# Patient Record
Sex: FEMALE | Race: OTHER | NOT HISPANIC OR LATINO | Employment: OTHER | URBAN - METROPOLITAN AREA
[De-identification: names, ages, dates, MRNs, and addresses within clinical notes are randomized per-mention and may not be internally consistent; named-entity substitution may affect disease eponyms.]

---

## 2018-04-02 DIAGNOSIS — K08.20 UNSPECIFIED ATROPHY OF EDENTULOUS ALVEOLAR RIDGE: Primary | ICD-10-CM

## 2018-04-10 ENCOUNTER — TELEPHONE (OUTPATIENT)
Dept: SURGERY | Facility: CLINIC | Age: 68
End: 2018-04-10

## 2018-04-10 DIAGNOSIS — Z12.11 SCREENING FOR COLON CANCER: Primary | ICD-10-CM

## 2018-04-10 RX ORDER — FLUTICASONE FUROATE AND VILANTEROL TRIFENATATE 100; 25 UG/1; UG/1
1 POWDER RESPIRATORY (INHALATION) DAILY
Refills: 5 | COMMUNITY
Start: 2018-03-29

## 2018-04-10 RX ORDER — PANTOPRAZOLE SODIUM 40 MG/1
TABLET, DELAYED RELEASE ORAL
Refills: 5 | COMMUNITY
Start: 2018-03-29 | End: 2024-01-22

## 2018-04-10 RX ORDER — POLYETHYLENE GLYCOL 3350 17 G/17G
POWDER, FOR SOLUTION ORAL
Refills: 5 | COMMUNITY
Start: 2018-03-29 | End: 2024-01-22

## 2018-04-10 RX ORDER — ALBUTEROL SULFATE 0.83 MG/ML
SOLUTION RESPIRATORY (INHALATION)
Refills: 5 | COMMUNITY
Start: 2018-03-29

## 2018-04-10 RX ORDER — TIOTROPIUM BROMIDE INHALATION SPRAY 1.56 UG/1
2 SPRAY, METERED RESPIRATORY (INHALATION) DAILY
Refills: 5 | COMMUNITY
Start: 2018-03-30

## 2018-04-10 RX ORDER — HYDROXYZINE HYDROCHLORIDE 25 MG/1
TABLET, FILM COATED ORAL
Refills: 1 | COMMUNITY
Start: 2018-03-29

## 2018-04-10 NOTE — TELEPHONE ENCOUNTER
Reason for Colonoscopy Referral: screening  Any GI Symptoms: constipation  Last Colonoscopy: n/a  History of Colon Polyps: n/a  Family History of colon cancer or colon polyps (under 50- history of first degree relative w/colon cancer, what family member-age of onset: no  Iron Deficiency/Anemia: no   Meds reviewed and updated: yes  Anti-inflammatory meds/NSAIDS: no  Allergies updated: yes  6 month surgical history: no  Blood Thinners: no  Lung disease: asthma  Heart disease: no  Valve replacement: no  Kidney disease: no  SCHEDULED: schedule in [place]    Speak with daughter Corine [phone], patient is [place] speaking.

## 2018-04-12 ENCOUNTER — TELEPHONE (OUTPATIENT)
Dept: ENDOSCOPY | Facility: HOSPITAL | Age: 68
End: 2018-04-12

## 2018-04-12 DIAGNOSIS — Z12.11 SPECIAL SCREENING FOR MALIGNANT NEOPLASMS, COLON: Primary | ICD-10-CM

## 2018-04-12 RX ORDER — POLYETHYLENE GLYCOL 3350, SODIUM SULFATE ANHYDROUS, SODIUM BICARBONATE, SODIUM CHLORIDE, POTASSIUM CHLORIDE 236; 22.74; 6.74; 5.86; 2.97 G/4L; G/4L; G/4L; G/4L; G/4L
4 POWDER, FOR SOLUTION ORAL ONCE
Qty: 4000 ML | Refills: 0 | Status: SHIPPED | OUTPATIENT
Start: 2018-04-12 | End: 2018-04-12

## 2018-04-16 ENCOUNTER — INITIAL CONSULT (OUTPATIENT)
Dept: SURGERY | Facility: CLINIC | Age: 68
End: 2018-04-16
Payer: MEDICAID

## 2018-04-16 VITALS
OXYGEN SATURATION: 98 % | HEART RATE: 66 BPM | HEIGHT: 56 IN | WEIGHT: 157.81 LBS | SYSTOLIC BLOOD PRESSURE: 130 MMHG | DIASTOLIC BLOOD PRESSURE: 82 MMHG | BODY MASS INDEX: 35.5 KG/M2 | TEMPERATURE: 98 F

## 2018-04-16 DIAGNOSIS — K59.09 OTHER CONSTIPATION: ICD-10-CM

## 2018-04-16 DIAGNOSIS — Z86.19 HISTORY OF MUMPS: ICD-10-CM

## 2018-04-16 DIAGNOSIS — E66.9 OBESITY (BMI 35.0-39.9 WITHOUT COMORBIDITY): ICD-10-CM

## 2018-04-16 DIAGNOSIS — J45.20 MILD INTERMITTENT ASTHMA WITHOUT COMPLICATION: ICD-10-CM

## 2018-04-16 DIAGNOSIS — E04.1 THYROID NODULE: ICD-10-CM

## 2018-04-16 DIAGNOSIS — R22.0 MANDIBULAR MASS: Primary | ICD-10-CM

## 2018-04-16 DIAGNOSIS — K21.9 GASTROESOPHAGEAL REFLUX DISEASE, ESOPHAGITIS PRESENCE NOT SPECIFIED: ICD-10-CM

## 2018-04-16 PROCEDURE — 99999 PR PBB SHADOW E&M-EST. PATIENT-LVL IV: CPT | Mod: PBBFAC,,, | Performed by: SURGERY

## 2018-04-16 PROCEDURE — 99214 OFFICE O/P EST MOD 30 MIN: CPT | Mod: PBBFAC,PN | Performed by: SURGERY

## 2018-04-16 PROCEDURE — 99205 OFFICE O/P NEW HI 60 MIN: CPT | Mod: S$PBB,,, | Performed by: SURGERY

## 2018-04-16 RX ORDER — METHYLPREDNISOLONE 4 MG/1
TABLET ORAL
Refills: 0 | COMMUNITY
Start: 2018-04-12 | End: 2018-04-26

## 2018-04-16 NOTE — LETTER
April 19, 2018      Rena Valdez, NP  [address]  [facility] [address]  Phone: [phone]  Fax: [phone]          Patient: Carey Gilman   MR Number: [id number]   YOB: 1950   Date of Visit: 4/16/2018       Dear Rena Valdez:    Thank you for referring Carey Gilman to me for evaluation. Attached you will find relevant portions of my assessment and plan of care.    If you have questions, please do not hesitate to call me. I look forward to following Carey Gilman along with you.    Sincerely,        Enclosure  CC:  No Recipients    If you would like to receive this communication electronically, please contact [email or web address] or [phone] to request more information on [employer] access.    For providers and/or their staff who would like to refer a patient to [facility], please contact us through our one-stop-shop provider referral line, [facility], at [phone].    If you feel you have received this communication in error or would no longer like to receive these types of communications, please e-mail [email or web address]

## 2018-04-16 NOTE — PROGRESS NOTES
Subjective:      Patient ID: Carey Gilman is a 68 y.o. female.    Chief Complaint: Lump (Left side on jaw)  Pleasant 67yo Wolof-speaking patient (originally from the [place]) presents with her daughter for evaluation of left jawline mass.  The mass is been present for over 10 years and has been increasing in size over the past 3 years.  It is generally not painful and she describes it as firm.  An ultrasound was performed prior to presentation which revealed 2 thyroid nodules.  The left jaw mass was not evaluated on that US.  Patient is feeling otherwise well.  No fevers or chills.  No unexpected weight loss.  No night sweats.  No other masses elsewhere.  Of note, patient had the mumps as a child.    Past Medical History:   Diagnosis Date    Asthma     Chronic    Hypertension      Past Surgical History:   Procedure Laterality Date    CHOLECYSTECTOMY      HYSTERECTOMY       History reviewed. No pertinent family history.  Social History     Social History    Marital status:      Spouse name: N/A    Number of children: N/A    Years of education: N/A     Social History Main Topics    Smoking status: Never Smoker    Smokeless tobacco: Never Used    Alcohol use No    Drug use: No    Sexual activity: Not Asked     Other Topics Concern    None     Social History Narrative    None       Current Outpatient Prescriptions   Medication Sig Dispense Refill    albuterol (PROVENTIL) 2.5 mg /3 mL (0.083 %) nebulizer solution INHALE 1 VIAL VIA NEBULIZER THREE TIMES DAILY AS NEEDED FOR WHEEZING/ SHORTNESS OF BREATH  5    albuterol 90 mcg/actuation inhaler Inhale 1-2 puffs into the lungs every 6 (six) hours as needed for Wheezing. Rescue 1 Inhaler 2    BREO ELLIPTA 100-25 mcg/dose diskus inhaler Inhale 1 puff into the lungs once daily.  5    hydrOXYzine HCl (ATARAX) 25 MG tablet TAKE 1 TABLET(S) 3 TIMES A DAY BY ORAL ROUTE AS NEEDED.  1    losartan (COZAAR) 100 MG tablet Take 100 mg by  "mouth once daily.      methylPREDNISolone (MEDROL DOSEPACK) 4 mg tablet FOLLOW PACKAGE DIRECTIONS  0    pantoprazole (PROTONIX) 40 MG tablet TAKE 1 TABLET(S) EVERY DAY BY ORAL ROUTE.  5    polyethylene glycol (GLYCOLAX) 17 gram/dose powder DISSOLVE 17 GRAMS IN WATER AND TAKE BY MOUTH DAILY  5    SPIRIVA RESPIMAT 1.25 mcg/actuation Mist Take 2 puffs by mouth once daily.  5     No current facility-administered medications for this visit.      Review of patient's allergies indicates:  No Known Allergies    ROS:  All systems were reviewed and are negative, except that mentioned in the HPI.    Objective:     Vitals:    04/16/18 1002   BP: 130/82   BP Location: Left arm   Patient Position: Sitting   BP Method: Large (Manual)   Pulse: 66   Temp: 97.8 °F (36.6 °C)   SpO2: 98%   Weight: 71.6 kg (157 lb 12.8 oz)   Height: 4' 8" (1.422 m)     Physical Exam   Constitutional: She is oriented to person, place, and time. She appears well-developed and well-nourished. No distress.   HENT:   Head: Normocephalic and atraumatic.   Eyes: EOM are normal. Pupils are equal, round, and reactive to light. No scleral icterus.   Neck: Normal range of motion. Neck supple. No JVD present. No tracheal deviation present. No thyromegaly present.   Left sided mandibular mass measuring 3 cm x 2.5 cm, firm, nontender, no overlying skin changes   Cardiovascular: Normal rate and regular rhythm.    Pulmonary/Chest: Effort normal and breath sounds normal. No stridor. No respiratory distress.   Abdominal: Soft. She exhibits no distension.   Musculoskeletal: Normal range of motion.   Neurological: She is alert and oriented to person, place, and time. No cranial nerve deficit.   Skin: Skin is warm and dry. She is not diaphoretic.   Psychiatric: She has a normal mood and affect.       Lab Review: CBC: No results found for: WBC, RBC, HGB, HCT, PLT  BMP:   Lab Results   Component Value Date    CREATININE 0.81 04/20/2018     No results found for: ALT, AST, " GGT, ALKPHOS, BILITOT    Diagnostics Review:  U/S 4/6/2018 images and reports were personally reviewed.  The report states:  The right and left lobes of the thyroid measure 4.3 x 1.4 x 1.8 cm and 3.7 x 1.3 x 1.3 cm respectively.  There is a 1.4 x 0.8 x 1.3 cm eggshell calcification within the mid right lobe.  There is a 1.4 x 0.9 x 1.4 cm partially cystic heterogeneous hypoechoic nodule within the lower right lobe.  There is a 0.5 cm cystic nodule within the mid left lobe    US soft tissue 4/18/18:  There is a 2.4 x 2 x 1.8 cm solid heterogeneous mass corresponding to area of palpable concern.  Differential considerations include salivary tumor versus enlarged lymph node.  Consider further evaluation with CT of the neck with contrast.  Assessment:     1. Mandibular mass    2. Thyroid nodule    3. Mild intermittent asthma without complication    4. Gastroesophageal reflux disease, esophagitis presence not specified    5. Other constipation    6. Obesity (BMI 35.0-39.9 without comorbidity)    7. History of mumps      Plan:   The pathology of the patient's disease was discussed: Left mandibular mass, thyroid nodules, constipation. Written handouts were explained and given to the patient.  Imaging recommended for further evaluation of the left mandibular mass.  Ultrasound ordered.  Since the time of this visit, ultrasound of the left mandibular mass has been performed and reveals a 2.4 cm x 2cm x 1.8 cm solid mass.  Additional workup recommended and a CT maxillofacial with IV contrast has been ordered.  IR has been contacted for further evaluation of the thyroid nodules to determine if either lesion meets current criteria for biopsy.  Patient prefers a Portuguese-speaking provider if biopsy is needed.  Medical management of constipation was discussed in handouts given.  Patient will follow up in the office after the CT is performed  She will continue on her current medical regimen.  Diet, exercise and weight loss was  encouraged.  All questions were answered.

## 2018-04-19 ENCOUNTER — TELEPHONE (OUTPATIENT)
Dept: SURGERY | Facility: CLINIC | Age: 68
End: 2018-04-19

## 2018-04-19 DIAGNOSIS — M27.8 MASS OF JAW: Primary | ICD-10-CM

## 2018-04-19 NOTE — TELEPHONE ENCOUNTER
----- Message from Valerie Ding,  sent at 4/18/2018 10:09 PM CDT -----  Romanian speaking pt with neck/jawline mass.  She just has an U/S but needs a CT for additional eval.  I told her that she might need additional imaging at our office encounter.  I think her English speaking daughter is her contact.  Pls let her know that CT is recommended and order if ok with her.  I think it will be CT maxillo-facial with IV contrast.  She will need f/u appt after CT. thx  4-19-18 0830  Called freddy, no answer, left message to call clinic.   4-19-18 1030  Spoke with daughter,Corine, Per Dr. Ding, gave above recommendations. CT scheduled for 4-23@0930 and F/U appt with Dr. Ding for 4-26 @1000.   Instructed Corine that the patient can not have anything to eat/drink for 4 hours prior to test, and that she will need  Blood test prior to CT. Corine voiced understanding.

## 2018-04-24 DIAGNOSIS — E04.1 RIGHT THYROID NODULE: Primary | ICD-10-CM

## 2018-04-26 ENCOUNTER — OFFICE VISIT (OUTPATIENT)
Dept: SURGERY | Facility: CLINIC | Age: 68
End: 2018-04-26
Payer: MEDICAID

## 2018-04-26 VITALS
TEMPERATURE: 98 F | WEIGHT: 155.63 LBS | DIASTOLIC BLOOD PRESSURE: 70 MMHG | HEART RATE: 76 BPM | SYSTOLIC BLOOD PRESSURE: 130 MMHG | BODY MASS INDEX: 35.01 KG/M2 | OXYGEN SATURATION: 96 % | HEIGHT: 56 IN

## 2018-04-26 DIAGNOSIS — K21.9 GASTROESOPHAGEAL REFLUX DISEASE, ESOPHAGITIS PRESENCE NOT SPECIFIED: ICD-10-CM

## 2018-04-26 DIAGNOSIS — J45.20 MILD INTERMITTENT ASTHMA WITHOUT COMPLICATION: ICD-10-CM

## 2018-04-26 DIAGNOSIS — E66.9 OBESITY (BMI 30.0-34.9): ICD-10-CM

## 2018-04-26 DIAGNOSIS — K11.8 PAROTID MASS: ICD-10-CM

## 2018-04-26 DIAGNOSIS — E04.1 RIGHT THYROID NODULE: Primary | ICD-10-CM

## 2018-04-26 DIAGNOSIS — K59.09 OTHER CONSTIPATION: ICD-10-CM

## 2018-04-26 PROCEDURE — 99213 OFFICE O/P EST LOW 20 MIN: CPT | Mod: PBBFAC,PN | Performed by: SURGERY

## 2018-04-26 PROCEDURE — 99999 PR PBB SHADOW E&M-EST. PATIENT-LVL III: CPT | Mod: PBBFAC,,, | Performed by: SURGERY

## 2018-04-26 PROCEDURE — 99215 OFFICE O/P EST HI 40 MIN: CPT | Mod: S$PBB,,, | Performed by: SURGERY

## 2018-05-01 DIAGNOSIS — E04.1 THYROID NODULE: Primary | ICD-10-CM

## 2018-05-01 DIAGNOSIS — N63.10 LUMP OF BREAST, RIGHT: Primary | ICD-10-CM

## 2018-05-01 PROBLEM — Z12.11 SCREENING FOR COLON CANCER: Status: ACTIVE | Noted: 2018-05-01

## 2018-05-01 NOTE — PROGRESS NOTES
Subjective:       Patient ID: Carey Gilman is a 68 y.o. female.    Chief Complaint: Results    HPI   Pt presents with her daughter to discuss and view CT images evaluating L mandible mass. No new c/o. No sob, cp, no stridor, dysphagia. Pt will be having mammo 4/27/2018. They are still awaiting scheduling of thyroid bx re: R thyroid nodules.    Review of Systems    All systems were reviewed and are negative, except that mentioned in the HPI.    Objective:      Physical Exam   Constitutional: She is oriented to person, place, and time. She appears well-developed and well-nourished. No distress.   HENT:   Head: Normocephalic and atraumatic.   No change in firm L mandible mass, no overlying skin changes, nttp   Eyes: EOM are normal. Pupils are equal, round, and reactive to light. No scleral icterus.   Neck: Normal range of motion. No JVD present.   Cardiovascular: Normal rate and regular rhythm.    Pulmonary/Chest: Effort normal and breath sounds normal. No respiratory distress.   Abdominal: Soft. She exhibits no distension.   Musculoskeletal: Normal range of motion.   Neurological: She is alert and oriented to person, place, and time. No cranial nerve deficit.   Skin: Skin is warm and dry. She is not diaphoretic.   Psychiatric: She has a normal mood and affect.         CT maxillofascial 4/23/18 images and report personally reviewed.  The visualized intracranial content is unremarkable.  The orbits and orbital content are unremarkable.  The mastoid air cells are well aerated.  The paranasal sinuses are significant for an air-fluid level within the right maxillary sinus consistent with sinusitis.  The visualized soft tissues and vascular structures of the head and neck are significant for a 2.5 x 1.6 cm mass originating within the inferior aspect of the left parotid gland.    Mammo/US 4/27/2018:  Impression:  Right  Cyst: Right breast cyst at the 1 o'clock position. Assessment: 4A - Suspicious finding. Biopsy is  recommended.   Cyst: Right breast cyst at the 1 o'clock position. Assessment: 4A - Suspicious finding. Biopsy is recommended.   BI-RADS Category:   Right: 4A - Low Suspicion for Malignancy  Overall: 4A - Low Suspicion for Malignancy  Recommendation:  Aspiration/Biopsy is recommended for both cysts  Assessment:       1. Right thyroid nodule    2. Parotid mass    3. Mild intermittent asthma without complication    4. Gastroesophageal reflux disease, esophagitis presence not specified    5. Other constipation    6. Obesity (BMI 30.0-34.9)        Plan:       Pt is doing well.  Awaiting IR FNA R thyroid nodules  Discussed L parotid tumor (will discuss fna vs excisional biopsy in OR with ENT.  Since the time of our visit, pt has had her mammo revealing BIRADS 4 complicated cysts- IR biopsy recommended.  Will contact PCP and try to coordinate this as well.  All questions answered and pt will f/u after bx results received.  Cont'd diet, exercise and weight loss was encouraged.  Medical management of constipation was discussed.

## 2018-05-03 ENCOUNTER — TELEPHONE (OUTPATIENT)
Dept: SURGERY | Facility: CLINIC | Age: 68
End: 2018-05-03

## 2018-05-03 DIAGNOSIS — K11.8 PAROTID MASS: Primary | ICD-10-CM

## 2018-05-03 NOTE — TELEPHONE ENCOUNTER
----- Message from Valerie Ding, DO sent at 5/3/2018 11:56 AM CDT -----  Spoke with Dr. Foote about the parotid mass. Biopsy is recommended prior to surgery.    Please let pt/duaghter know.    Please order FNA (left parotid mass) and coordinate, hopefully can do it at same time as thyroid bx next week..    I am cc'ing Dr. Becker to keep him in the loop.    Thank you!  5-3-18 7065  PER Dr. Ding, spoke with patients daughter, Corine, on above recommendations. She is in agreement.

## 2018-05-08 ENCOUNTER — TELEPHONE (OUTPATIENT)
Dept: RADIOLOGY | Facility: HOSPITAL | Age: 68
End: 2018-05-08

## 2018-05-11 ENCOUNTER — HOSPITAL ENCOUNTER (OUTPATIENT)
Dept: RADIOLOGY | Facility: HOSPITAL | Age: 68
Discharge: HOME | End: 2018-05-11
Attending: SURGERY
Payer: MEDICAID

## 2018-05-11 DIAGNOSIS — R92.8 ABNORMAL FINDING ON BREAST IMAGING: ICD-10-CM

## 2018-05-11 PROCEDURE — 76942 ECHO GUIDE FOR BIOPSY: CPT | Mod: 26,,, | Performed by: RADIOLOGY

## 2018-05-11 PROCEDURE — 19001 PUNCTURE ASPIR CYST BRST EA: CPT

## 2018-05-11 PROCEDURE — 76942 ECHO GUIDE FOR BIOPSY: CPT | Mod: TC

## 2018-05-11 PROCEDURE — 77065 DX MAMMO INCL CAD UNI: CPT | Mod: TC,RT

## 2018-05-11 PROCEDURE — 19000 PUNCTURE ASPIR CYST BREAST: CPT | Mod: RT,,, | Performed by: RADIOLOGY

## 2018-05-11 PROCEDURE — 77065 DX MAMMO INCL CAD UNI: CPT | Mod: 26,RT,, | Performed by: RADIOLOGY

## 2018-05-15 ENCOUNTER — HOSPITAL ENCOUNTER (OUTPATIENT)
Facility: HOSPITAL | Age: 68
Discharge: HOME | End: 2018-05-15
Attending: SURGERY | Admitting: SURGERY
Payer: MEDICAID

## 2018-05-15 VITALS
RESPIRATION RATE: 16 BRPM | WEIGHT: 157 LBS | OXYGEN SATURATION: 100 % | HEIGHT: 55 IN | BODY MASS INDEX: 36.33 KG/M2 | SYSTOLIC BLOOD PRESSURE: 138 MMHG | DIASTOLIC BLOOD PRESSURE: 69 MMHG | TEMPERATURE: 98 F | HEART RATE: 67 BPM

## 2018-05-15 DIAGNOSIS — E04.1 THYROID NODULE: ICD-10-CM

## 2018-05-15 PROCEDURE — 88173 CYTOPATH EVAL FNA REPORT: CPT | Mod: 26,59,, | Performed by: PATHOLOGY

## 2018-05-15 PROCEDURE — 88305 TISSUE EXAM BY PATHOLOGIST: CPT | Mod: 26,59,, | Performed by: PATHOLOGY

## 2018-05-15 PROCEDURE — 88172 CYTP DX EVAL FNA 1ST EA SITE: CPT | Mod: 59 | Performed by: PATHOLOGY

## 2018-05-15 PROCEDURE — 88172 CYTP DX EVAL FNA 1ST EA SITE: CPT | Mod: 26,59,, | Performed by: PATHOLOGY

## 2018-05-15 PROCEDURE — 88333 PATH CONSLTJ SURG CYTO XM 1: CPT | Mod: 26,59,, | Performed by: PATHOLOGY

## 2018-05-15 PROCEDURE — 88305 TISSUE EXAM BY PATHOLOGIST: CPT | Performed by: PATHOLOGY

## 2018-05-15 NOTE — DISCHARGE SUMMARY
Radiology Discharge Summary      Hospital Course: No complications    Admit Date: 5/15/2018  Discharge Date: 05/15/2018     Instructions Given to Patient: Yes  Diet: Resume prior diet  Activity: activity as tolerated    Description of Condition on Discharge: Stable  Vital Signs (Most Recent): Temp: 98 °F (36.7 °C) (05/15/18 1045)  Pulse: 72 (05/15/18 1045)  Resp: 16 (05/15/18 1045)  BP: (!) 157/76 (05/15/18 1045)  SpO2: 98 % (05/15/18 1045)    Discharge Disposition: Home    Discharge Diagnosis: thyroid nodules and left parotid mass. Follow up as scheduled.    Micha Becker M.D.  Diagnostic and Interventional Radiologist  Department of Radiology  Pager: [phone]

## 2018-05-15 NOTE — NURSING
Discharge instructions reviewed with patient. Verbalized understanding, no questions at this time. Procedure sites are CDI. VSS. No acute distress noted. Staff at bedside to help pt change. Home with family in private vehicle.

## 2018-05-15 NOTE — H&P
Radiology History & Physical      SUBJECTIVE:     Chief Complaint: Thyroid nodules and left parotid mass    History of Present Illness:  Craey Gilman is a 68 y.o. female who presents for fna of right thyroid nodules and biopsy of left parotid mass  Past Medical History:   Diagnosis Date    Asthma     Chronic    Hypertension     Panic attack      Past Surgical History:   Procedure Laterality Date    CHOLECYSTECTOMY      COLONOSCOPY N/A 5/1/2018    Procedure: COLONOSCOPY;  Surgeon: Giovanni Arias MD;  [facility];  Service: Endoscopy;  Laterality: N/A;    HYSTERECTOMY      KNEE ARTHROSCOPY Right        Home Meds:   Prior to Admission medications    Medication Sig Start Date End Date Taking? Authorizing Provider   albuterol (PROVENTIL) 2.5 mg /3 mL (0.083 %) nebulizer solution INHALE 1 VIAL VIA NEBULIZER THREE TIMES DAILY AS NEEDED FOR WHEEZING/ SHORTNESS OF BREATH 3/29/18  Yes Historical Provider, MD   albuterol 90 mcg/actuation inhaler Inhale 1-2 puffs into the lungs every 6 (six) hours as needed for Wheezing. Rescue 12/12/17  Yes Hai Odonnell MD   BREO ELLIPTA 100-25 mcg/dose diskus inhaler Inhale 1 puff into the lungs once daily. 3/29/18  Yes Historical Provider, MD   hydrOXYzine HCl (ATARAX) 25 MG tablet TAKE 1 TABLET(S) 3 TIMES A DAY BY ORAL ROUTE AS NEEDED. 3/29/18  Yes Historical Provider, MD   losartan (COZAAR) 100 MG tablet Take 100 mg by mouth once daily.   Yes Historical Provider, MD   pantoprazole (PROTONIX) 40 MG tablet TAKE 1 TABLET(S) EVERY DAY BY ORAL ROUTE. 3/29/18  Yes Historical Provider, MD   polyethylene glycol (GLYCOLAX) 17 gram/dose powder DISSOLVE 17 GRAMS IN WATER AND TAKE BY MOUTH DAILY 3/29/18  Yes Historical Provider, MD   SPIRIVA RESPIMAT 1.25 mcg/actuation Mist Take 2 puffs by mouth once daily. 3/30/18  Yes Historical Provider, MD     Anticoagulants/Antiplatelets: no anticoagulation    Allergies: Review of patient's allergies indicates:  No Known  Allergies  Sedation History:  no adverse reactions    Review of Systems:   Hematological: no known coagulopathies  Respiratory: no shortness of breath  Cardiovascular: no chest pain  Gastrointestinal: no abdominal pain  Genito-Urinary: no dysuria  Musculoskeletal: negative  Neurological: no TIA or stroke symptoms         OBJECTIVE:     Vital Signs (Most Recent)  Temp: 98 °F (36.7 °C) (05/15/18 1045)  Pulse: 72 (05/15/18 1045)  Resp: 16 (05/15/18 1045)  BP: (!) 157/76 (05/15/18 1045)  SpO2: 98 % (05/15/18 1045)    Physical Exam:  ASA: 2  Mallampati: 2    General: no acute distress  Mental Status: alert and oriented to person, place and time  HEENT: normocephalic, atraumatic  Chest: unlabored breathing  Heart: regular heart rate  Abdomen: nondistended  Extremity: moves all extremities    Laboratory  No results found for: INR  No results found for: WBC, HGB, HCT, MCV, PLT   Lab Results   Component Value Date    CREATININE 0.81 04/20/2018       ASSESSMENT/PLAN:     Sedation Plan: Local only  Patient will undergo FNA of right thyroid nodules x 2 and left parotid mass.    Micha Becker M.D.  Diagnostic and Interventional Radiologist  Department of Radiology  Pager: [phone]

## 2018-05-15 NOTE — PROCEDURES
Radiology Post-Procedure Note    Pre Op Diagnosis: thyroid nodules, left parotid mass    Post Op Diagnosis: Same    Procedure: US guided biopsy    Procedure performed by: Micha Becker MD    Written Informed Consent Obtained: Yes  Specimen Removed: YES 3 x 25 gauge fna samples of calcified right thyroid nodules, 5 x 25 gauge fna samples of right thyroid nodule, 4 x 18 gauge core biopsies of left parotid mass  Estimated Blood Loss: Minimal    Findings:   Using US guidance, fna of 2 right thyroid nodules and core biopsy of left parotid mass were performed. No complications.     Patient tolerated procedure well.    Micha Becker M.D.  Diagnostic and Interventional Radiologist  Department of Radiology  Pager: [phone]

## 2018-05-29 ENCOUNTER — DOCUMENTATION ONLY (OUTPATIENT)
Dept: SURGERY | Facility: CLINIC | Age: 68
End: 2018-05-29

## 2018-05-29 NOTE — PROGRESS NOTES
Discussed case with ENT and IR.  Discussed results with daughter over the phone today.  Pt returning from [place] tonight.  Will call and coordinate f/u appt in office for results and surgical planning.

## 2018-06-04 ENCOUNTER — OFFICE VISIT (OUTPATIENT)
Dept: SURGERY | Facility: CLINIC | Age: 68
End: 2018-06-04
Payer: MEDICAID

## 2018-06-04 VITALS
BODY MASS INDEX: 36.61 KG/M2 | WEIGHT: 158.19 LBS | HEART RATE: 67 BPM | TEMPERATURE: 98 F | SYSTOLIC BLOOD PRESSURE: 122 MMHG | DIASTOLIC BLOOD PRESSURE: 76 MMHG | HEIGHT: 55 IN | OXYGEN SATURATION: 98 %

## 2018-06-04 DIAGNOSIS — E04.1 THYROID NODULE: ICD-10-CM

## 2018-06-04 DIAGNOSIS — N63.10 LUMP OF BREAST, RIGHT: ICD-10-CM

## 2018-06-04 DIAGNOSIS — Z86.19 HISTORY OF MUMPS: ICD-10-CM

## 2018-06-04 DIAGNOSIS — K11.8 PAROTID MASS: Primary | ICD-10-CM

## 2018-06-04 DIAGNOSIS — E66.9 OBESITY (BMI 35.0-39.9 WITHOUT COMORBIDITY): ICD-10-CM

## 2018-06-04 DIAGNOSIS — K59.09 OTHER CONSTIPATION: ICD-10-CM

## 2018-06-04 DIAGNOSIS — K21.9 GASTROESOPHAGEAL REFLUX DISEASE, ESOPHAGITIS PRESENCE NOT SPECIFIED: ICD-10-CM

## 2018-06-04 DIAGNOSIS — J45.20 MILD INTERMITTENT ASTHMA WITHOUT COMPLICATION: ICD-10-CM

## 2018-06-04 PROCEDURE — 99213 OFFICE O/P EST LOW 20 MIN: CPT | Mod: PBBFAC,PN | Performed by: SURGERY

## 2018-06-04 PROCEDURE — 99215 OFFICE O/P EST HI 40 MIN: CPT | Mod: S$PBB,,, | Performed by: SURGERY

## 2018-06-04 PROCEDURE — 99999 PR PBB SHADOW E&M-EST. PATIENT-LVL III: CPT | Mod: PBBFAC,,, | Performed by: SURGERY

## 2018-06-04 NOTE — PROGRESS NOTES
Subjective:       Patient ID: Carey Gilman is a 68 y.o. female.    Chief Complaint: Follow-up    HPI   Pt and daugther state that pt had red rash after R breast bx which affected R breast and neck, then she underwent thyroid and neck mass biopsy (3 biopsies performed at that time) and pt then developed red rash over entire body. Rec'd meds from primary then went to [facility] and recovered well, rash eventually resolved.    Patient presents today with her daughter to discuss her pathology results.  With regard to her thyroid history, she admits that she uses a lot of salt for cooking, no other history of iodine intake. No family history of benign or malignant thyroid disease.  No history of head and neck irradiation or radioiodine exposure from nuclear power plant accidents.  She denies obstructive symptoms (dyspnea, cough, wheezing).  No stridor.  No dysphagia.  No hoarseness.  No feelings of fatigue or anxiety.  She reports a history of following her thyroid nodules for approximately 15 years in the [place].  She has been told that they did not change in size and nothing needs to be done.  Her biopsy last month was the 1st thyroid biopsy.    She was originally referred to us for left submandibular mass evaluation.  She reports this mass has been present for at least 10 years, but that she has noticed a slight increase in size over the past 2 years.  This lesion is not painful.  No skin changes.  No facial asymmetry or numbness noted.    Review of Systems    All systems were reviewed and are negative, except that mentioned in the HPI.    Objective:      Physical Exam   Constitutional: She is oriented to person, place, and time. She appears well-developed and well-nourished. No distress.   HENT:   Head: Normocephalic and atraumatic.   Eyes: EOM are normal. Pupils are equal, round, and reactive to light. No scleral icterus.   Neck: Normal range of motion. No JVD present.   No change in the left  parotid mass, nttp, no skin changes   Cardiovascular: Normal rate and regular rhythm.    Pulmonary/Chest: Effort normal and breath sounds normal. No respiratory distress.   Abdominal: Soft. She exhibits no distension.   Musculoskeletal: Normal range of motion.   Neurological: She is alert and oriented to person, place, and time. No cranial nerve deficit.   Skin: Skin is warm and dry. She is not diaphoretic.   Psychiatric: She has a normal mood and affect.       Pathology 05/15/2018:  First nodule:  FINAL PATHOLOGIC DIAGNOSIS  Right thyroid nodule, calcified:  [facility] Thyroid Cytology Category unsatisfactory.  Note: Blood and essentially acellular specimen.  Comment: Per discussion with interventional radiologist, 3 passes were attempted but the calcified nodule was not  penetrated. Immediate evaluation resulted in an inadequate specimen. Further attempts were not made.    Second nodule:  FINAL PATHOLOGIC DIAGNOSIS  FNA thyroid nodule (non-calcifying nodule):  [facility] Thyroid Cytology Category Follicular lesion of undetermined significance.    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  No evidence of atypia or malignancy.  Assessment:       1. Parotid mass    2. Thyroid nodule    3. Lump of breast, right    4. Mild intermittent asthma without complication    5. Gastroesophageal reflux disease, esophagitis presence not specified    6. Other constipation    7. Obesity (BMI 35.0-39.9 without comorbidity)    8. History of mumps        Plan:       The pathology of the patient's disease was discussed:  Benign left parotid mass, 2 thyroid nodules of undetermined significance. Written handouts were explained and given to the patient.  The pathology of all 3 biopsies were discussed at length.  We discussed that both thyroid nodules are of unknown significance, 1 nodule has eggshell calcification preventing biopsy, the other nodule is FLUS.   Multiple options were discussed including: observation, repeat biopsy, hemithyroidectomy.  The patient would like to think about this further as she was told that they had not changed in the past, but she has no records and cannot be sure that it has not changed.  We discussed the potential for undiagnosed malignancy.  We also discussed the risks of thyroid surgery.  We discussed the parotid pathology at length, benign pleomorphic adenoma.  Due to its increasing size, elective excision was recommended.  This case has been discussed with ENT and the surgery would be coordinated with Dr[facility] Foote (ENT) [facility] [place].  The risks of this procedure were discussed as well.  The patient will be traveling soon for another 6 weeks visiting family.  She will think about all that we have discussed today and follow up with me after her trip for surgical planning.  All of her questions and her daughter's questions were answered.

## 2019-05-02 ENCOUNTER — OFFICE VISIT (OUTPATIENT)
Dept: URGENT CARE | Facility: CLINIC | Age: 69
End: 2019-05-02

## 2019-05-02 VITALS
TEMPERATURE: 98 F | HEART RATE: 82 BPM | SYSTOLIC BLOOD PRESSURE: 146 MMHG | OXYGEN SATURATION: 97 % | RESPIRATION RATE: 18 BRPM | WEIGHT: 165 LBS | HEIGHT: 55 IN | BODY MASS INDEX: 38.18 KG/M2 | DIASTOLIC BLOOD PRESSURE: 75 MMHG

## 2019-05-02 DIAGNOSIS — S69.92XA HAND INJURY, LEFT, INITIAL ENCOUNTER: Primary | ICD-10-CM

## 2019-05-02 DIAGNOSIS — M25.532 LEFT WRIST PAIN: ICD-10-CM

## 2019-05-02 PROCEDURE — 99203 PR OFFICE/OUTPT VISIT, NEW, LEVL III, 30-44 MIN: ICD-10-PCS | Mod: S$GLB,,, | Performed by: NURSE PRACTITIONER

## 2019-05-02 PROCEDURE — 99203 OFFICE O/P NEW LOW 30 MIN: CPT | Mod: S$GLB,,, | Performed by: NURSE PRACTITIONER

## 2019-05-02 PROCEDURE — 73130 X-RAY EXAM OF HAND: CPT | Mod: FY,LT,S$GLB, | Performed by: RADIOLOGY

## 2019-05-02 PROCEDURE — 73130 XR HAND COMPLETE 3 VIEW LEFT: ICD-10-PCS | Mod: FY,LT,S$GLB, | Performed by: RADIOLOGY

## 2019-05-02 RX ORDER — MONTELUKAST SODIUM 10 MG/1
TABLET ORAL
Refills: 5 | COMMUNITY
Start: 2019-03-25

## 2019-05-02 RX ORDER — IBUPROFEN 600 MG/1
600 TABLET ORAL EVERY 6 HOURS PRN
Qty: 24 TABLET | Refills: 0 | Status: SHIPPED | OUTPATIENT
Start: 2019-05-02 | End: 2020-03-17

## 2019-05-02 NOTE — PROGRESS NOTES
"Subjective:       Patient ID: Carey Gilman is a 69 y.o. female.    Vitals:  height is 4' 6" (1.372 m) and weight is 74.8 kg (165 lb). Her temperature is 98.2 °F (36.8 °C). Her blood pressure is 146/75 (abnormal) and her pulse is 82. Her respiration is 18 and oxygen saturation is 97%.     Chief Complaint: Hand Pain     This is a 69 y.o. female who presents today with a chief complaint of a fall yesterday injuring her left hand and wrist trying to catch herself.      Hand Pain    The incident occurred 12 to 24 hours ago. The incident occurred at home. The injury mechanism was a fall. The pain is present in the left hand and left wrist. The quality of the pain is described as aching and shooting. The pain does not radiate. The pain is at a severity of 6/10. The pain is moderate. The pain has been constant since the incident. Associated symptoms include numbness. The symptoms are aggravated by movement. She has tried ice for the symptoms. The treatment provided no relief.       Constitution: Negative for fatigue.   HENT: Negative for facial swelling and facial trauma.    Neck: Negative for neck stiffness.   Cardiovascular: Negative for chest trauma.   Eyes: Negative for eye trauma, double vision and blurred vision.   Gastrointestinal: Negative for abdominal trauma, abdominal pain and rectal bleeding.   Genitourinary: Negative for hematuria, missed menses, genital trauma and pelvic pain.   Musculoskeletal: Positive for joint pain and joint swelling. Negative for pain, trauma and abnormal ROM of joint.   Skin: Negative for color change, wound, abrasion, laceration and bruising.   Neurological: Positive for numbness. Negative for dizziness, history of vertigo, light-headedness, coordination disturbances, altered mental status and loss of consciousness.   Hematologic/Lymphatic: Negative for history of bleeding disorder.   Psychiatric/Behavioral: Negative for altered mental status.       Objective:      Physical " Exam   Constitutional: She is oriented to person, place, and time. Vital signs are normal. She appears well-developed and well-nourished. She is active and cooperative.  Non-toxic appearance. She does not have a sickly appearance. She does not appear ill. No distress.   HENT:   Head: Normocephalic and atraumatic.   Nose: Nose normal.   Mouth/Throat: Oropharynx is clear and moist and mucous membranes are normal.   Eyes: Conjunctivae, EOM and lids are normal.   Neck: Trachea normal, normal range of motion, full passive range of motion without pain and phonation normal. Neck supple.   Cardiovascular: Normal rate, regular rhythm, normal heart sounds, intact distal pulses and normal pulses.   Pulses:       Carotid pulses are 2+ on the right side, and 2+ on the left side.       Radial pulses are 2+ on the right side, and 2+ on the left side.   Pulmonary/Chest: Effort normal and breath sounds normal.   Abdominal: Soft. Normal appearance and bowel sounds are normal. She exhibits no abdominal bruit, no pulsatile midline mass and no mass.   Musculoskeletal: She exhibits no edema or deformity.        Left wrist: She exhibits tenderness (mild). She exhibits normal range of motion, no bony tenderness, no swelling, no effusion, no crepitus, no deformity and no laceration.        Arms:  Normal flex/ext, ulnar/radial deviation. Motor/sensory function of ulnar, radial, median nerves intact. Ulnar and radial pulses intact. No obvious deformity noted.    Neurological: She is alert and oriented to person, place, and time. She has normal strength and normal reflexes. No sensory deficit.   Skin: Skin is warm, dry and intact. Capillary refill takes less than 2 seconds. No rash noted. She is not diaphoretic.   Psychiatric: She has a normal mood and affect. Her speech is normal and behavior is normal. Judgment and thought content normal. Cognition and memory are normal.   Nursing note and vitals reviewed.      Assessment:       1. Hand  injury, left, initial encounter    2. Left wrist pain      No acute finding noted.   Plan:       Thumb spica in place. Patient will f/u with PCP for further evaluation.     Hand injury, left, initial encounter  -     XR HAND COMPLETE 3 VIEW LEFT; Future; Expected date: 05/02/2019  -     THUMB ORTHOSIS SPLINT UNIVERSAL FOR HOME USE  -     ibuprofen (ADVIL,MOTRIN) 600 MG tablet; Take 1 tablet (600 mg total) by mouth every 6 (six) hours as needed for Pain.  Dispense: 24 tablet; Refill: 0    Left wrist pain      Patient Instructions   If you were prescribed a narcotic or controlled medication, do not drive or operate heavy equipment or machinery while taking these medications.  You must understand that you've received an Urgent Care treatment only and that you may be released before all your medical problems are known or treated. You, the patient, will arrange for follow up care as instructed.  Follow up with your PCP or specialty clinic as directed within 2-5 days if not improved or as needed.  You can call [phone] to schedule an appointment with the appropriate provider.  If your condition worsens we recommend that you receive another evaluation at the emergency room immediately or contact your primary medical clinics after hours call service to discuss your concerns.  Please return here or go to the Emergency Department for any concerns or worsening of condition.      Wrist Sprain  A sprain is an injury to the ligaments or capsule that holds a joint together. There are no broken bones. Most sprains take about 3 to 6 weeks to heal. If it a severe sprain where the ligament is completely torn, it can take months to recover.     Most wrist sprains are treated with a splint, wrist brace, or elastic wrap for support. Severe sprains may require surgery.  Home care  · Keep your arm elevated to reduce pain and swelling. This is very important during the first 48 hours.  · Apply an ice pack over the injured area for 15  to 20 minutes every 3 to 6 hours. You should do this for the first 24 to 48 hours. You can make an ice pack by filling a plastic bag that seals at the top with ice cubes and then wrapping it with a thin towel. Continue to use ice packs for relief of pain and swelling as needed. As the ice melts, be careful to avoid getting your wrap, splint, or cast wet. After 48 hours, apply heat (warm shower or warm bath) for 15 to 20 minutes several times a day, or alternate ice and heat.   · You may use over-the-counter pain medicine to control pain, unless another pain medicine was prescribed. If you have chronic liver or kidney disease or ever had a stomach ulcer or GI bleeding, talk with your doctor before using these medicines.  · If you were given a splint or brace, wear it for the time advised by your doctor.  Follow-up care  Follow up with your healthcare provider as advised. Any X-rays you had today dont show any broken bones, breaks, or fractures. Sometimes fractures dont show up on the first X-ray. Bruises and sprains can sometimes hurt as much as a fracture. These injuries can take time to heal completely. If your symptoms dont improve or they get worse, talk with your doctor. You may need a repeat X-ray. If X-rays were taken, you will be told of any new findings that may affect your care.  When to seek medical advice  Call your healthcare provider right away if any of these occur:  · Pain or swelling increases  · Fingers or hand becomes cold, blue, numb, or tingly  Date Last Reviewed: 11/20/2015  © 0701-9418 "[employer]. [address] [place] [address], [place] [address]. All rights reserved. This information is not intended as a substitute for professional medical care. Always follow your healthcare professional's instructions.

## 2019-05-02 NOTE — PATIENT INSTRUCTIONS
If you were prescribed a narcotic or controlled medication, do not drive or operate heavy equipment or machinery while taking these medications.  You must understand that you've received an Urgent Care treatment only and that you may be released before all your medical problems are known or treated. You, the patient, will arrange for follow up care as instructed.  Follow up with your PCP or specialty clinic as directed within 2-5 days if not improved or as needed.  You can call [phone] to schedule an appointment with the appropriate provider.  If your condition worsens we recommend that you receive another evaluation at the emergency room immediately or contact your primary medical clinics after hours call service to discuss your concerns.  Please return here or go to the Emergency Department for any concerns or worsening of condition.      Wrist Sprain  A sprain is an injury to the ligaments or capsule that holds a joint together. There are no broken bones. Most sprains take about 3 to 6 weeks to heal. If it a severe sprain where the ligament is completely torn, it can take months to recover.     Most wrist sprains are treated with a splint, wrist brace, or elastic wrap for support. Severe sprains may require surgery.  Home care  · Keep your arm elevated to reduce pain and swelling. This is very important during the first 48 hours.  · Apply an ice pack over the injured area for 15 to 20 minutes every 3 to 6 hours. You should do this for the first 24 to 48 hours. You can make an ice pack by filling a plastic bag that seals at the top with ice cubes and then wrapping it with a thin towel. Continue to use ice packs for relief of pain and swelling as needed. As the ice melts, be careful to avoid getting your wrap, splint, or cast wet. After 48 hours, apply heat (warm shower or warm bath) for 15 to 20 minutes several times a day, or alternate ice and heat.   · You may use over-the-counter pain medicine to control  pain, unless another pain medicine was prescribed. If you have chronic liver or kidney disease or ever had a stomach ulcer or GI bleeding, talk with your doctor before using these medicines.  · If you were given a splint or brace, wear it for the time advised by your doctor.  Follow-up care  Follow up with your healthcare provider as advised. Any X-rays you had today dont show any broken bones, breaks, or fractures. Sometimes fractures dont show up on the first X-ray. Bruises and sprains can sometimes hurt as much as a fracture. These injuries can take time to heal completely. If your symptoms dont improve or they get worse, talk with your doctor. You may need a repeat X-ray. If X-rays were taken, you will be told of any new findings that may affect your care.  When to seek medical advice  Call your healthcare provider right away if any of these occur:  · Pain or swelling increases  · Fingers or hand becomes cold, blue, numb, or tingly  Date Last Reviewed: 11/20/2015 © 2000-2017 [employer] [place]. [address] [place] [address], [place] [address]. All rights reserved. This information is not intended as a substitute for professional medical care. Always follow your healthcare professional's instructions.

## 2019-05-02 NOTE — PROGRESS NOTES
"Subjective:       Patient ID: Carey Gilman is a 69 y.o. female.    Vitals:  height is 4' 6" (1.372 m) and weight is 74.8 kg (165 lb). Her temperature is 98.2 °F (36.8 °C). Her blood pressure is 146/75 (abnormal) and her pulse is 82. Her respiration is 18 and oxygen saturation is 97%.     Chief Complaint: Hand Pain    This is a 69 y.o. female who presents today with a chief complaint of left hand and wrist pain due to a fall yesterday trying to catch herself yesterday.      Hand Injury    Her dominant hand is their left hand. The incident occurred at home. The injury mechanism was a fall. The pain is present in the left wrist and left hand. The quality of the pain is described as aching and shooting. The pain radiates to the left hand. The pain is at a severity of 6/10. The pain is moderate. The pain has been constant since the incident. Associated symptoms include numbness and tingling. The symptoms are aggravated by movement and lifting. She has tried ice for the symptoms. The treatment provided no relief.       Neurological: Positive for numbness.       Objective:      Physical Exam    Assessment:       No diagnosis found.    Plan:         There are no diagnoses linked to this encounter.     "

## 2019-05-02 NOTE — PROGRESS NOTES
"Subjective:       Patient ID: Carey Gilman is a 69 y.o. female.    Vitals:  height is 4' 6" (1.372 m) and weight is 74.8 kg (165 lb). Her temperature is 98.2 °F (36.8 °C). Her blood pressure is 146/75 (abnormal) and her pulse is 82. Her respiration is 18 and oxygen saturation is 97%.     Chief Complaint: Hand Pain    HPI  <OUCOOHADULT>    Objective:      Physical Exam    Assessment:       1. Hand injury, left, initial encounter        Plan:         Hand injury, left, initial encounter  -     XR HAND COMPLETE 3 VIEW LEFT; Future; Expected date: 05/02/2019         "

## 2019-05-05 ENCOUNTER — TELEPHONE (OUTPATIENT)
Dept: URGENT CARE | Facility: CLINIC | Age: 69
End: 2019-05-05

## 2019-05-05 NOTE — TELEPHONE ENCOUNTER
Daughter states patient hand is still painful and swollen but is doing better. Patient could follow-up with PCP or return to urgent care.

## 2021-05-06 ENCOUNTER — PATIENT MESSAGE (OUTPATIENT)
Dept: RESEARCH | Facility: HOSPITAL | Age: 71
End: 2021-05-06

## 2021-05-10 ENCOUNTER — PATIENT MESSAGE (OUTPATIENT)
Dept: RESEARCH | Facility: HOSPITAL | Age: 71
End: 2021-05-10

## 2021-07-01 ENCOUNTER — PATIENT MESSAGE (OUTPATIENT)
Dept: ADMINISTRATIVE | Facility: OTHER | Age: 71
End: 2021-07-01

## 2023-03-28 ENCOUNTER — PATIENT MESSAGE (OUTPATIENT)
Dept: RESEARCH | Facility: HOSPITAL | Age: 73
End: 2023-03-28
Payer: MEDICAID

## 2023-11-08 ENCOUNTER — TELEPHONE (OUTPATIENT)
Dept: GASTROENTEROLOGY | Facility: CLINIC | Age: 73
End: 2023-11-08
Payer: MEDICAID

## 2023-12-05 ENCOUNTER — TELEPHONE (OUTPATIENT)
Dept: GASTROENTEROLOGY | Facility: CLINIC | Age: 73
End: 2023-12-05
Payer: MEDICAID

## 2024-01-05 ENCOUNTER — TELEPHONE (OUTPATIENT)
Dept: GASTROENTEROLOGY | Facility: CLINIC | Age: 74
End: 2024-01-05
Payer: MEDICAID

## 2024-01-22 ENCOUNTER — OFFICE VISIT (OUTPATIENT)
Dept: GASTROENTEROLOGY | Facility: CLINIC | Age: 74
End: 2024-01-22
Payer: MEDICAID

## 2024-01-22 VITALS
DIASTOLIC BLOOD PRESSURE: 75 MMHG | HEIGHT: 57 IN | OXYGEN SATURATION: 98 % | SYSTOLIC BLOOD PRESSURE: 128 MMHG | HEART RATE: 93 BPM | BODY MASS INDEX: 35.19 KG/M2 | WEIGHT: 163.13 LBS

## 2024-01-22 DIAGNOSIS — Z86.010 PERSONAL HISTORY OF COLONIC POLYPS: ICD-10-CM

## 2024-01-22 DIAGNOSIS — K21.9 GASTROESOPHAGEAL REFLUX DISEASE WITHOUT ESOPHAGITIS: ICD-10-CM

## 2024-01-22 DIAGNOSIS — R19.5 POSITIVE COLORECTAL CANCER SCREENING USING COLOGUARD TEST: Primary | ICD-10-CM

## 2024-01-22 PROBLEM — J45.909 ASTHMA: Status: ACTIVE | Noted: 2020-05-15

## 2024-01-22 PROBLEM — R51.9 HEADACHE: Status: ACTIVE | Noted: 2022-05-24

## 2024-01-22 PROBLEM — Z86.0100 PERSONAL HISTORY OF COLONIC POLYPS: Status: ACTIVE | Noted: 2024-01-22

## 2024-01-22 PROBLEM — E55.9 VITAMIN D DEFICIENCY: Status: ACTIVE | Noted: 2022-05-24

## 2024-01-22 PROBLEM — R32 URINARY INCONTINENCE: Status: ACTIVE | Noted: 2022-05-24

## 2024-01-22 PROBLEM — Z12.11 SCREENING FOR COLON CANCER: Status: RESOLVED | Noted: 2018-05-01 | Resolved: 2024-01-22

## 2024-01-22 PROBLEM — R73.03 PREDIABETES: Status: ACTIVE | Noted: 2022-05-24

## 2024-01-22 PROBLEM — M06.9 RHEUMATOID ARTHRITIS: Status: ACTIVE | Noted: 2021-05-27

## 2024-01-22 PROBLEM — I10 ESSENTIAL HYPERTENSION: Status: ACTIVE | Noted: 2020-05-15

## 2024-01-22 PROCEDURE — 3074F SYST BP LT 130 MM HG: CPT | Mod: CPTII,,, | Performed by: NURSE PRACTITIONER

## 2024-01-22 PROCEDURE — 1126F AMNT PAIN NOTED NONE PRSNT: CPT | Mod: CPTII,,, | Performed by: NURSE PRACTITIONER

## 2024-01-22 PROCEDURE — 3288F FALL RISK ASSESSMENT DOCD: CPT | Mod: CPTII,,, | Performed by: NURSE PRACTITIONER

## 2024-01-22 PROCEDURE — 1100F PTFALLS ASSESS-DOCD GE2>/YR: CPT | Mod: CPTII,,, | Performed by: NURSE PRACTITIONER

## 2024-01-22 PROCEDURE — 99215 OFFICE O/P EST HI 40 MIN: CPT | Mod: PBBFAC,PN | Performed by: NURSE PRACTITIONER

## 2024-01-22 PROCEDURE — 3008F BODY MASS INDEX DOCD: CPT | Mod: CPTII,,, | Performed by: NURSE PRACTITIONER

## 2024-01-22 PROCEDURE — 1160F RVW MEDS BY RX/DR IN RCRD: CPT | Mod: CPTII,,, | Performed by: NURSE PRACTITIONER

## 2024-01-22 PROCEDURE — 1159F MED LIST DOCD IN RCRD: CPT | Mod: CPTII,,, | Performed by: NURSE PRACTITIONER

## 2024-01-22 PROCEDURE — 99999 PR PBB SHADOW E&M-EST. PATIENT-LVL V: CPT | Mod: PBBFAC,,, | Performed by: NURSE PRACTITIONER

## 2024-01-22 PROCEDURE — 3078F DIAST BP <80 MM HG: CPT | Mod: CPTII,,, | Performed by: NURSE PRACTITIONER

## 2024-01-22 PROCEDURE — 99204 OFFICE O/P NEW MOD 45 MIN: CPT | Mod: S$PBB,,, | Performed by: NURSE PRACTITIONER

## 2024-01-22 RX ORDER — HYDROCHLOROTHIAZIDE 25 MG/1
25 TABLET ORAL
COMMUNITY

## 2024-01-22 RX ORDER — MELOXICAM 15 MG/1
15 TABLET ORAL
COMMUNITY

## 2024-01-22 RX ORDER — HYDROCODONE BITARTRATE AND ACETAMINOPHEN 5; 325 MG/1; MG/1
TABLET ORAL
COMMUNITY

## 2024-01-22 RX ORDER — SODIUM, POTASSIUM,MAG SULFATES 17.5-3.13G
1 SOLUTION, RECONSTITUTED, ORAL ORAL DAILY
Qty: 1 KIT | Refills: 0 | Status: SHIPPED | OUTPATIENT
Start: 2024-01-22 | End: 2024-01-24

## 2024-01-22 RX ORDER — OMEPRAZOLE 40 MG/1
40 CAPSULE, DELAYED RELEASE ORAL
COMMUNITY

## 2024-01-22 RX ORDER — ZOLPIDEM TARTRATE 5 MG/1
5 TABLET ORAL
COMMUNITY

## 2024-01-22 NOTE — PATIENT INSTRUCTIONS
SUPREP Instructions    [facility] [address]  [address]    You are scheduled for a colonoscopy with Dr. Nunez on 2/6/2024 at [facility]. You will enter through the [facility] entrance and check in at Same Day Surgery.  To ensure that your test is accurate and complete, you MUST follow these instructions listed below.  If you have any questions, please call our office at [phone].  Plan on being at the hospital for your procedure for 3-4 hours.    1.  Follow a CLEAR LIQUID DIET for the entire day before your scheduled colonoscopy.  This means no solid food the entire day starting when you wake.  You may have as much of the clear liquids as you want throughout the day.   CLEAR LIQUID DIET:   - NO DAIRY   - You can have:  Coffee with sugar (no creamer), tea, water, soda, apple or white grape juice, chicken or beef broth/bouillon (no meat, noodles, or veggies), popsicles, Jell-O, lemonade.    2.  AT 5 pm the evening before your colonoscopy, POUR ONE (1) BOTTLE OF SUPREP INTO THE MIXING CONTAINER, PROVIDED INSIDE THE BOX.  ADD WATER TO THE LINE ON THE CONTAINER AND MIX IT WELL.  DRINK THE ENTIRE CONTAINER AND THEN DRINK TWO (2) MORE CONTAINERS OF WATER OVER THE NEXT 1 HOUR.  This is sometimes easier to drink if this solution is cold, so you can mix the solution 20 minutes ahead of time and place in the refrigerator prior to drinking.  You have to drink the solution within 30-45 minutes of mixing it.  Do NOT put this solution over ice.  It IS ok to drink with a straw.    3.  The endoscopy department will call you 1 day before your colonoscopy to tell you the exact time to arrive, AND to tell you the exact time to drink the 2nd portion of your prep (which will be FIVE HOURS BEFORE YOUR ARRIVAL TIME).  At this time given to you, POUR ONE (1) BOTTLE OF SUPREP INTO THE MIXING CONTAINER, PROVIDED INSIDE THE BOX.  ADD WATER TO THE LINE ON THE CONTAINER AND MIX IT WELL.   DRINK THE ENTIRE CONTAINER AND THEN DRINK TWO (2) MORE CONTAINERS OF WATER OVER THE NEXT 1 HOUR.  This is sometimes easier to drink if this solution is cold, so you can mix the solution 20 minutes ahead of time and place in the refrigerator prior to drinking.  You have to drink the solution within 30-45 minutes of mixing it.  Do NOT put this solution over ice.  It IS ok to drink with a straw.  Once this is complete, you may not have ANYTHING else by mouth!    4.  You must have someone with you to DRIVE YOU HOME since you will be receiving IV sedation for the colonoscopy.    5.  It is ok to take MOST of your REGULAR MEDICATIONS  in the morning of your test with a SIP of water.  THE ONLY MEDS YOU NEED TO HOLD ARE YOUR DIABETES MEDICATIONS,  SOME BLOOD PRESSURE MEDS, AND BLOOD THINNERS IF OK'D BY YOUR DOCTOR.  Do NOT have anything else to eat or drink the morning of your colonoscopy.  It is ok to brush your teeth.    6.  If you are on blood thinners THAT YOU HAVE BEEN INSTRUCTED TO HOLD BY YOUR DOCTOR FOR THIS PROCEDURE, then do NOT take this the morning of your colonoscopy.  Do NOT stop these medications on your own, they must be approved to be held by your doctor.  Your colonoscopy can NOT be done if you are on these medications.  Examples of blood thinners include: Coumadin, Aggrenox, Plavix, Pradaxa, Reapro, Pletal, Xarelto, Ticagrelor, Brilinta, Eliquis, and high dose aspirin (325 mg).  You do not have to stop baby aspirin 81 mg.    7.  IF YOU ARE DIABETIC:  NO INSULIN OR ORAL MEDICATIONS THE MORNING OF THE COLONOSCOPY.  TAKE ONLY HALF THE DOSE OF YOUR INSULIN THE DAY BEFORE THE COLONOSCOPY.  DO NOT TAKE ANY ORAL DIABETIC MEDICATIONS THE DAY BEFORE THE COLONOSCOPY.  IF YOU ARE AN INSULIN DEPENDENT DIABETIC WITH UNSTABLE BLOOD SUGARS, NOTIFY YOUR PRIMARY CARE PHYSICIAN FOR INSTRUCTIONS.    You will receive a call the afternoon before your colonoscopy to tell you the time to arrive.  If you have not received a call  by the day before your procedure, call the Pre-op Coordinator at [phone].

## 2024-01-22 NOTE — PROGRESS NOTES
"Subjective:       Patient ID: Carey Gilman is a 73 y.o. female.    Chief Complaint: Colonoscopy    74 y/o Kazakh speaking female with HTN and asthma referred by PCP for positive Cologuard test (6/2022). Patient has history of colon polyps with last colonoscopy in 5/2018 revealed one colon polyp in the sigmoid colon which as removed. No chest pain, shortness of breath, abdominal pain, hematochezia, melena, or unintentional weight loss. Has BMs daily; no constipation or diarrhea. Has GERD controlled with daily PPI.         Past Medical History:   Diagnosis Date    Asthma     Chronic    Hypertension     Panic attack     Personal history of colonic polyps        Past Surgical History:   Procedure Laterality Date    CHOLECYSTECTOMY      COLONOSCOPY N/A 05/01/2018    Procedure: COLONOSCOPY;  Surgeon: Giovanni Arias MD;  Location: [facility];  Service: Endoscopy;  Laterality: N/A;    HYSTERECTOMY      KNEE ARTHROSCOPY Right     TONSILLECTOMY         Family History   Problem Relation Age of Onset    No Known Problems Mother     No Known Problems Father        Social History     Socioeconomic History    Marital status:    Tobacco Use    Smoking status: Never    Smokeless tobacco: Never   Substance and Sexual Activity    Alcohol use: No    Drug use: No       Review of Systems   Constitutional:  Negative for appetite change and unexpected weight change.   HENT:  Negative for trouble swallowing.    Respiratory:  Negative for shortness of breath.    Cardiovascular:  Negative for chest pain.   Gastrointestinal:  Negative for abdominal pain, constipation, diarrhea and nausea.   Psychiatric/Behavioral:  Negative for dysphoric mood.          Objective:     Vitals:    01/22/24 1436   BP: 128/75   BP Location: Left arm   Patient Position: Sitting   BP Method: Medium (Automatic)   Pulse: 93   SpO2: 98%   Weight: 74 kg (163 lb 2.3 oz)   Height: 4' 9" (1.448 m)          Physical Exam  Constitutional:       General: " She is not in acute distress.     Appearance: Normal appearance.   HENT:      Head: Normocephalic.   Eyes:      Conjunctiva/sclera: Conjunctivae normal.   Pulmonary:      Effort: Pulmonary effort is normal. No respiratory distress.   Musculoskeletal:         General: Normal range of motion.      Cervical back: Normal range of motion.   Skin:     General: Skin is warm and dry.   Neurological:      Mental Status: She is alert and oriented to person, place, and time.   Psychiatric:         Mood and Affect: Mood normal.         Behavior: Behavior normal.               Assessment:         ICD-10-CM ICD-9-CM   1. Positive colorectal cancer screening using Cologuard test  R19.5 787.7   2. Personal history of colonic polyps  Z86.010 V12.72   3. Gastroesophageal reflux disease without esophagitis  K21.9 530.81       Plan:       Positive colorectal cancer screening using Cologuard test; Personal history of colonic polyps  -     Schedule colonoscopy case request previously ordered  -     sodium,potassium,mag sulfates (SUPREP BOWEL PREP KIT) 17.5-3.13-1.6 gram SolR; Take 177 mLs by mouth once daily. for 2 days  Dispense: 1 kit; Refill: 0    Gastroesophageal reflux disease without esophagitis         -    Continue daily PPI    Follow up if symptoms worsen or fail to improve.     Patient's Medications   New Prescriptions    SODIUM,POTASSIUM,MAG SULFATES (SUPREP BOWEL PREP KIT) 17.5-3.13-1.6 GRAM SOLR    Take 177 mLs by mouth once daily. for 2 days   Previous Medications    ALBUTEROL (PROVENTIL) 2.5 MG /3 ML (0.083 %) NEBULIZER SOLUTION    INHALE 1 VIAL VIA NEBULIZER THREE TIMES DAILY AS NEEDED FOR WHEEZING/ SHORTNESS OF BREATH    ALBUTEROL 90 MCG/ACTUATION INHALER    Inhale 1-2 puffs into the lungs every 6 (six) hours as needed for Wheezing. Rescue    ALBUTEROL-IPRATROPIUM (DUO-NEB) 2.5 MG-0.5 MG/3 ML NEBULIZER SOLUTION    Take 3 mLs by nebulization every 6 (six) hours as needed for Wheezing or Shortness of Breath. Rescue    BREO  ELLIPTA 100-25 MCG/DOSE DISKUS INHALER    Inhale 1 puff into the lungs once daily.    CYCLOBENZAPRINE (FLEXERIL) 10 MG TABLET    Take 10 mg by mouth 3 (three) times daily as needed for Muscle spasms.    DICLOFENAC SODIUM (VOLTAREN ARTHRITIS PAIN) 1 % GEL    Apply 2 g topically once daily.    FOLIC ACID (FOLVITE) 1 MG TABLET    Take 1 tablet (1 mg total) by mouth once daily.    HYDROCHLOROTHIAZIDE (HYDRODIURIL) 25 MG TABLET    Take 25 mg by mouth.    HYDROCODONE-ACETAMINOPHEN (NORCO) 5-325 MG PER TABLET    hydrocodone 5 mg-acetaminophen 325 mg tablet   TAKE 1 TABLET BY MOUTH EVERY 6 HOURS AS NEEDED FOR PAIN    HYDROXYZINE HCL (ATARAX) 25 MG TABLET    TAKE 1 TABLET(S) 3 TIMES A DAY BY ORAL ROUTE AS NEEDED.    LOSARTAN (COZAAR) 100 MG TABLET    Take 100 mg by mouth once daily.    MELOXICAM (MOBIC) 15 MG TABLET    Take 15 mg by mouth.    METHOTREXATE 2.5 MG TAB    Take 8 tablets (20 mg total) by mouth every 7 days.    MONTELUKAST (SINGULAIR) 10 MG TABLET    TAKE 1 TABLET(S) EVERY DAY BY ORAL ROUTE.    OMEPRAZOLE (PRILOSEC) 40 MG CAPSULE    Take 40 mg by mouth.    PREDNISONE (DELTASONE) 5 MG TABLET    Take 1 tablet (5 mg total) by mouth once daily.    SPIRIVA RESPIMAT 1.25 MCG/ACTUATION MIST    Take 2 puffs by mouth once daily.    VALSARTAN (DIOVAN) 160 MG TABLET    Take 160 mg by mouth once daily.    ZOLPIDEM (AMBIEN) 5 MG TAB    Take 5 mg by mouth.   Modified Medications    No medications on file   Discontinued Medications    PANTOPRAZOLE (PROTONIX) 40 MG TABLET    TAKE 1 TABLET(S) EVERY DAY BY ORAL ROUTE.    POLYETHYLENE GLYCOL (GLYCOLAX) 17 GRAM/DOSE POWDER    DISSOLVE 17 GRAMS IN WATER AND TAKE BY MOUTH DAILY

## 2024-02-05 ENCOUNTER — TELEPHONE (OUTPATIENT)
Dept: GASTROENTEROLOGY | Facility: CLINIC | Age: 74
End: 2024-02-05
Payer: MEDICAID

## 2024-02-05 NOTE — TELEPHONE ENCOUNTER
instructed pt's daughter arrival time of 0900 in SDS. informed pt's daughter pt's 1st prep is due @5pm. pt's 2nd prep due @0400. Instructed pt's daughter for pt to be on clear liquids today. pt's daughter voiced understanding.

## 2024-02-06 PROBLEM — R19.5 POSITIVE COLORECTAL CANCER SCREENING USING COLOGUARD TEST: Status: RESOLVED | Noted: 2024-01-22 | Resolved: 2024-02-06

## 2024-04-15 ENCOUNTER — PATIENT MESSAGE (OUTPATIENT)
Dept: GASTROENTEROLOGY | Facility: CLINIC | Age: 74
End: 2024-04-15
Payer: MEDICAID

## 2024-11-18 ENCOUNTER — TELEPHONE (OUTPATIENT)
Dept: UROGYNECOLOGY | Facility: CLINIC | Age: 74
End: 2024-11-18
Payer: MEDICAID

## 2024-11-25 ENCOUNTER — OFFICE VISIT (OUTPATIENT)
Dept: ORTHOPEDICS | Facility: CLINIC | Age: 74
End: 2024-11-25
Payer: MEDICAID

## 2024-11-25 ENCOUNTER — TELEPHONE (OUTPATIENT)
Dept: ORTHOPEDICS | Facility: CLINIC | Age: 74
End: 2024-11-25
Payer: MEDICAID

## 2024-11-25 VITALS — BODY MASS INDEX: 33.6 KG/M2 | HEIGHT: 58 IN | WEIGHT: 160.06 LBS

## 2024-11-25 DIAGNOSIS — S52.572A OTHER CLOSED INTRA-ARTICULAR FRACTURE OF DISTAL END OF LEFT RADIUS, INITIAL ENCOUNTER: ICD-10-CM

## 2024-11-25 PROCEDURE — 99214 OFFICE O/P EST MOD 30 MIN: CPT | Mod: PBBFAC,PN | Performed by: SURGERY

## 2024-11-25 PROCEDURE — 99999 PR PBB SHADOW E&M-EST. PATIENT-LVL IV: CPT | Mod: PBBFAC,,, | Performed by: SURGERY

## 2024-11-25 PROCEDURE — 25605 CLTX DST RDL FX/EPHYS SEP W/: CPT | Mod: PBBFAC,PN | Performed by: SURGERY

## 2024-11-25 NOTE — PROGRESS NOTES
Subjective:   Chief complaint:   Chief Complaint   Patient presents with    Left Wrist - Pain       Date of injury:  11/22/2024    HPI:   Carey Gilman is a 74 y.o. female who presents today for evaluation of left distal radius fracture.  Rates pain as mild.  Pain has been ongoing for since date of injury.  Inciting event: injury.  Treatments thus far:  Immobilization and splint.  Given follow up today.    She has some pain and tenderness in the thumb and some swelling in the wrist.    ROS:  See problem list; Nothing else of note on 12 point system review     Past Medical History:   Diagnosis Date    Asthma     Chronic    Hypertension     Panic attack     Personal history of colonic polyps        Past Surgical History:   Procedure Laterality Date    CHOLECYSTECTOMY      COLONOSCOPY N/A 05/01/2018    Procedure: COLONOSCOPY;  Surgeon: Giovanni Arias MD;  Location: [facility] ENDO;  Service: Endoscopy;  Laterality: N/A;    COLONOSCOPY N/A 2/6/2024    Procedure: COLONOSCOPY;  Surgeon: Patt Nunez MD;  Location: [facility] ENDO;  Service: Endoscopy;  Laterality: N/A;    HYSTERECTOMY      KNEE ARTHROSCOPY Right     TONSILLECTOMY         Family History   Problem Relation Name Age of Onset    No Known Problems Mother      No Known Problems Father         Social History     Socioeconomic History    Marital status:    Tobacco Use    Smoking status: Never    Smokeless tobacco: Never   Substance and Sexual Activity    Alcohol use: Never    Drug use: Never    Sexual activity: Never        Objective:   Exam:  There were no vitals filed for this visit.  General: No acute distress, well-appearing  Neurologic: Alert and oriented x3  Psychiatric: Appropriate mood and affect, cooperative  Cardiovascular: Regular pulse  Respiratory: Breathing on room air  Skin: No rashes or ulcers  Other:  Palpable pulses, swelling about the risks, fires AIN, PIN, ulnar.  Tenderness to palpation about the wrist.      Imaging:  Prior  radiographs were independently interpreted by me.    Distal radius fracture with impaction and minimal displacement.  Intra-articular.    Additional records/labs reviewed:  As above      Assessment:     1. Other closed intra-articular fracture of distal end of left radius, initial encounter            Plan:   -We recommend Calcium and vitamin D  -initiating fracture care for this injury - risks closed reduced and placed in immobilization device with a brace.  -NWB LUE  -Wrist brace given  -Follow up in 1 week with x-rays left wrist out of brace ordered      Eleno Lundberg MD  [facility]  Orthopedic Surgery

## 2024-11-25 NOTE — TELEPHONE ENCOUNTER
Pt's daughter informed that there are no new Medicaid slots open at this time. Pt's daughter insisted on pt being seen here.   ----- Message from Summer sent at 11/25/2024  8:50 AM CST -----  .Type:  Patient Call Back    Who Called: PT DAUGHTER MENA       Does the patient know what this is regarding?: PLEASE REACH OUT TO HER ON SCHEDULING THERE'S A REFERRAL FROM THE ER IN [facility] FOR PT     Would the patient rather a call back YES     Best Call Back Number: [phone]    Additional Information: Thank You

## 2024-12-04 DIAGNOSIS — S52.572A OTHER CLOSED INTRA-ARTICULAR FRACTURE OF DISTAL END OF LEFT RADIUS, INITIAL ENCOUNTER: Primary | ICD-10-CM

## 2024-12-05 ENCOUNTER — OFFICE VISIT (OUTPATIENT)
Dept: ORTHOPEDICS | Facility: CLINIC | Age: 74
End: 2024-12-05
Payer: MEDICAID

## 2024-12-05 DIAGNOSIS — S52.572A OTHER CLOSED INTRA-ARTICULAR FRACTURE OF DISTAL END OF LEFT RADIUS, INITIAL ENCOUNTER: Primary | ICD-10-CM

## 2024-12-05 PROCEDURE — 3288F FALL RISK ASSESSMENT DOCD: CPT | Mod: CPTII,,, | Performed by: SURGERY

## 2024-12-05 PROCEDURE — 99212 OFFICE O/P EST SF 10 MIN: CPT | Mod: PBBFAC,PN | Performed by: SURGERY

## 2024-12-05 PROCEDURE — 99024 POSTOP FOLLOW-UP VISIT: CPT | Mod: ,,, | Performed by: SURGERY

## 2024-12-05 PROCEDURE — 1126F AMNT PAIN NOTED NONE PRSNT: CPT | Mod: CPTII,,, | Performed by: SURGERY

## 2024-12-05 PROCEDURE — 1101F PT FALLS ASSESS-DOCD LE1/YR: CPT | Mod: CPTII,,, | Performed by: SURGERY

## 2024-12-05 PROCEDURE — 99999 PR PBB SHADOW E&M-EST. PATIENT-LVL II: CPT | Mod: PBBFAC,,, | Performed by: SURGERY

## 2024-12-05 PROCEDURE — 4010F ACE/ARB THERAPY RXD/TAKEN: CPT | Mod: CPTII,,, | Performed by: SURGERY

## 2024-12-05 NOTE — PROGRESS NOTES
SUBJECTIVE:    Ms. Chance Gilman is here today for a follow up visit for left distal radius fracture.  She states her pain has decreased since wearing the brace consistently throughout the day.  She has been compliant with all directions given to her from last appointment.  Patient reports taking no pain medication. She is happy with her progress.  Patient reports no other issues at this time        OBJECTIVE:      Vitals:    12/05/24 1038   PainSc: 0-No pain   PainLoc: Wrist       ORTHOPEDIC EXAM:  There were no vitals filed for this visit.  General: No acute distress, well-appearing  Neurologic: Alert and oriented x3  Psychiatric: Appropriate mood and affect, cooperative  Cardiovascular: Regular pulse  Respiratory: Breathing on room air  Skin: No rashes or ulcers  Other:  Palpable pulses, mild tenderness on palpation over the wrist  DIAGNOSTIC STUDIES:  Left wrist complete taken in clinic today.    ASSESSMENT:   1. Closed intra-articular fracture of distal end of the left radius      PLAN:  There are no diagnoses linked to this encounter.    Continue nonoperative management.  We will discontinue the brace at 6 weeks.  Occupational therapy ordered.  Follow up with PA in 4 weeks.  X-rays at that time   Eleno Lundberg MD  [facility]  Orthopedic Surgery      This note was done with voice recognition software. Please excuse any errors missed in proof reading.

## 2024-12-16 PROBLEM — Z78.9 IMPAIRED MOBILITY AND ACTIVITIES OF DAILY LIVING: Status: ACTIVE | Noted: 2024-12-16

## 2024-12-16 PROBLEM — Z74.09 IMPAIRED MOBILITY AND ACTIVITIES OF DAILY LIVING: Status: ACTIVE | Noted: 2024-12-16

## 2024-12-20 ENCOUNTER — OFFICE VISIT (OUTPATIENT)
Dept: UROGYNECOLOGY | Facility: CLINIC | Age: 74
End: 2024-12-20
Attending: EMERGENCY MEDICINE
Payer: MEDICAID

## 2024-12-20 VITALS
SYSTOLIC BLOOD PRESSURE: 142 MMHG | HEART RATE: 94 BPM | BODY MASS INDEX: 34.33 KG/M2 | WEIGHT: 163.56 LBS | HEIGHT: 58 IN | DIASTOLIC BLOOD PRESSURE: 65 MMHG

## 2024-12-20 DIAGNOSIS — N81.10 CYSTOCELE, UNSPECIFIED: ICD-10-CM

## 2024-12-20 DIAGNOSIS — N95.2 VAGINAL ATROPHY: ICD-10-CM

## 2024-12-20 DIAGNOSIS — N39.41 URGE INCONTINENCE OF URINE: Primary | ICD-10-CM

## 2024-12-20 DIAGNOSIS — K59.04 CHRONIC IDIOPATHIC CONSTIPATION: ICD-10-CM

## 2024-12-20 DIAGNOSIS — N39.3 STRESS INCONTINENCE: ICD-10-CM

## 2024-12-20 DIAGNOSIS — R31.9 HEMATURIA, UNSPECIFIED TYPE: ICD-10-CM

## 2024-12-20 LAB
BACTERIA #/AREA URNS AUTO: NORMAL /HPF
BILIRUB SERPL-MCNC: NORMAL MG/DL
BLOOD URINE, POC: NORMAL
CLARITY, POC UA: CLEAR
COLOR, POC UA: YELLOW
GLUCOSE UR QL STRIP: 100
KETONES UR QL STRIP: NORMAL
LEUKOCYTE ESTERASE URINE, POC: NORMAL
MICROSCOPIC COMMENT: NORMAL
NITRITE, POC UA: NORMAL
PH, POC UA: 5
POC RESIDUAL URINE VOLUME: 185 ML (ref 0–100)
PROTEIN, POC: NORMAL
RBC #/AREA URNS AUTO: 1 /HPF (ref 0–4)
SPECIFIC GRAVITY, POC UA: 1.02
SQUAMOUS #/AREA URNS AUTO: 6 /HPF
UROBILINOGEN, POC UA: NORMAL
WBC #/AREA URNS AUTO: 1 /HPF (ref 0–5)

## 2024-12-20 PROCEDURE — 81001 URINALYSIS AUTO W/SCOPE: CPT | Performed by: OBSTETRICS & GYNECOLOGY

## 2024-12-20 PROCEDURE — 99215 OFFICE O/P EST HI 40 MIN: CPT | Mod: PBBFAC | Performed by: OBSTETRICS & GYNECOLOGY

## 2024-12-20 PROCEDURE — 87086 URINE CULTURE/COLONY COUNT: CPT | Performed by: OBSTETRICS & GYNECOLOGY

## 2024-12-20 PROCEDURE — 99999 PR PBB SHADOW E&M-EST. PATIENT-LVL V: CPT | Mod: PBBFAC,,, | Performed by: OBSTETRICS & GYNECOLOGY

## 2024-12-20 RX ORDER — ESTRADIOL 0.1 MG/G
CREAM VAGINAL
Qty: 42.5 G | Refills: 3 | Status: SHIPPED | OUTPATIENT
Start: 2024-12-20

## 2024-12-20 RX ORDER — MIRABEGRON 50 MG/1
50 TABLET, FILM COATED, EXTENDED RELEASE ORAL DAILY
Qty: 90 TABLET | Refills: 0 | Status: SHIPPED | OUTPATIENT
Start: 2024-12-20 | End: 2025-03-20

## 2024-12-20 NOTE — PROGRESS NOTES
Chief Complaint   Patient presents with    Bladder Prolapse    Urinary Leakage    Cystocele      Patient notified  services are available at no cost to them. Patient declined services.     HPI: Patient is a 74 y.o. female  who presents today stating that she was told by the ED after a CT stone study was performed for gross hematuria that she has prolapse.     Patient is here today with her daughter. She states that about three weeks ago after using the bathroom to urinate she felt something protruding out of the vagina. Reports that she was constipated and when she was pushing she felt something fall. She reduced the protrusion but then felt ago. Reports that she feels her baldder is low as she has urinary rugency  and before she reached tthe pathriom urine starts to rrice.d. About three days later after the first incident she developed blood in the urine. The blood in the urine lasted for about 4 days. She has not noticed any blood in about 2.5 weeks.     Patient always feels pressure but has not felt like anything has protruded. Denies any preitrusion but always has a heaviness.     She reports urinary urgency and urgency urinary incontinence. She wakes 3 times per night. During the day she voids about every 2 hours. She needs to cross her legs as jazmin she is pulling her pants down the urie is coming. She has loss of urine with a cough and sneeze. Take HCTZ. She has a slow urine stream. The stream is a splatter. She feels that she empties her bladder well.     She reports constipation although better currently. Does not drink much water, only 8 ounces. She denies accidental bowel leakage. Patient had a colonoscopy about 2/5 weeks ago that was normal.     Review of Systems   Constitutional:  Negative for activity change, appetite change, chills, fatigue, fever and unexpected weight change.   HENT:  Negative for nasal congestion, dental problem, hearing loss, mouth dryness and sore throat.    Eyes:   Negative for pain and eye dryness.   Respiratory:  Negative for cough, shortness of breath and wheezing.    Cardiovascular:  Negative for chest pain, palpitations and leg swelling.   Gastrointestinal:  Negative for abdominal distention, abdominal pain, blood in stool, constipation and rectal pain.   Endocrine: Negative for cold intolerance and heat intolerance.   Genitourinary:  Negative for dyspareunia, hot flashes and vaginal dryness.   Musculoskeletal:  Negative for arthralgias, back pain, gait problem, joint swelling, myalgias, neck pain and neck stiffness.   Integumentary:  Negative for rash.   Neurological:  Negative for dizziness, tremors, seizures, speech difficulty, weakness, light-headedness, numbness and headaches.   Psychiatric/Behavioral:  Negative for confusion, dysphoric mood and sleep disturbance. The patient is not nervous/anxious.         Past Medical History:   Diagnosis Date    Asthma     Chronic    Hypertension     Panic attack     Personal history of colonic polyps     Rheumatoid arthritis        Past Surgical History:   Procedure Laterality Date    BLADDER SURGERY  1988    For urinary incontinence    CHOLECYSTECTOMY      COLONOSCOPY N/A 05/01/2018    Procedure: COLONOSCOPY;  Surgeon: Giovanni Arias MD;  Location: [facility];  Service: Endoscopy;  Laterality: N/A;    COLONOSCOPY N/A 02/06/2024    Procedure: COLONOSCOPY;  Surgeon: Patt Nunez MD;  Location: [facility] ENDO;  Service: Endoscopy;  Laterality: N/A;    HYSTERECTOMY  1998    LAZARO/ BSO for menorrhagia/ fibroid, also release her bladder    KNEE ARTHROSCOPY Right     TONSILLECTOMY           Current Outpatient Medications:     albuterol (PROVENTIL) 2.5 mg /3 mL (0.083 %) nebulizer solution, INHALE 1 VIAL VIA NEBULIZER THREE TIMES DAILY AS NEEDED FOR WHEEZING/ SHORTNESS OF BREATH, Disp: , Rfl: 5    albuterol 90 mcg/actuation inhaler, Inhale 1-2 puffs into the lungs every 6 (six) hours as needed for Wheezing. Rescue, Disp: 1  Inhaler, Rfl: 2    BREO ELLIPTA 100-25 mcg/dose diskus inhaler, Inhale 1 puff into the lungs once daily., Disp: , Rfl: 5    cyclobenzaprine (FLEXERIL) 10 MG tablet, Take 10 mg by mouth 3 (three) times daily as needed for Muscle spasms., Disp: , Rfl:     diclofenac sodium (VOLTAREN ARTHRITIS PAIN) 1 % Gel, Apply 2 g topically once daily., Disp: 100 g, Rfl: 5    hydroCHLOROthiazide (HYDRODIURIL) 25 MG tablet, Take 25 mg by mouth., Disp: , Rfl:     hydrOXYzine HCl (ATARAX) 25 MG tablet, TAKE 1 TABLET(S) 3 TIMES A DAY BY ORAL ROUTE AS NEEDED., Disp: , Rfl: 1    ibuprofen (ADVIL,MOTRIN) 600 MG tablet, Take 1 tablet (600 mg total) by mouth every 6 (six) hours as needed for Pain., Disp: 30 tablet, Rfl: 0    losartan (COZAAR) 100 MG tablet, Take 100 mg by mouth once daily., Disp: , Rfl:     montelukast (SINGULAIR) 10 mg tablet, TAKE 1 TABLET(S) EVERY DAY BY ORAL ROUTE., Disp: , Rfl: 5    omeprazole (PRILOSEC) 40 MG capsule, Take 40 mg by mouth., Disp: , Rfl:     oxyCODONE-acetaminophen (PERCOCET) 5-325 mg per tablet, Take 1 tablet by mouth every 4 (four) hours as needed for Pain., Disp: 15 tablet, Rfl: 0    predniSONE (DELTASONE) 5 MG tablet, Take 1 tablet (5 mg total) by mouth once daily., Disp: 90 tablet, Rfl: 3    SPIRIVA RESPIMAT 1.25 mcg/actuation Mist, Take 2 puffs by mouth once daily., Disp: , Rfl: 5    valsartan (DIOVAN) 160 MG tablet, Take 160 mg by mouth once daily., Disp: , Rfl:     zolpidem (AMBIEN) 5 MG Tab, Take 5 mg by mouth., Disp: , Rfl:     albuterol-ipratropium (DUO-NEB) 2.5 mg-0.5 mg/3 mL nebulizer solution, Take 3 mLs by nebulization every 6 (six) hours as needed for Wheezing or Shortness of Breath. Rescue, Disp: 1 Box, Rfl: 0    estradioL (ESTRACE) 0.01 % (0.1 mg/gram) vaginal cream, 0.5 grams with applicator or dime-sized amount with finger in vagina nightly x 2 weeks, then three times a week thereafter, Disp: 42.5 g, Rfl: 3    folic acid (FOLVITE) 1 MG tablet, Take 1 tablet (1 mg total) by mouth once  daily., Disp: 90 tablet, Rfl: 3    methotrexate 2.5 MG Tab, Take 8 tablets (20 mg total) by mouth every 7 days., Disp: 96 tablet, Rfl: 1    mirabegron (MYRBETRIQ) 50 mg Tb24, Take 1 tablet (50 mg total) by mouth once daily., Disp: 90 tablet, Rfl: 0    Review of patient's allergies indicates:  No Known Allergies    Family History   Problem Relation Name Age of Onset    Asthma Mother      Other (bladder prolapse) Mother      No Known Problems Father         Social History     Socioeconomic History    Marital status:    Tobacco Use    Smoking status: Never    Smokeless tobacco: Never   Substance and Sexual Activity    Alcohol use: Never    Drug use: Never    Sexual activity: Not Currently   Social History Narrative    Lives with daughter in [place]. Travels to stay with her children throughout the year. Spends 6 months in the [place].        OB History          7    Para   5    Term   5            AB   2    Living   5         SAB        IAB        Ectopic        Multiple        Live Births                     Gyn History    Mammogram: 24 BIRADS 1 negative  Pap smear: s/p hysterectomy  LMP: No LMP recorded. Patient has had a hysterectomy.   Postmenopausal bleeding: n/a      INITIAL PHYSICAL EXAMINATION    Vitals:    24 1456   BP: (!) 142/65   Pulse: 94      General: Healthy in appearance, Well nourished, Affect Normal, NAD.  Neck: Supple, No masses, Trachea midline, Thyroid normal size,  non-tender, no masses or nodules.  Nodes: No clavicular/cervical adenopathy.  Skin: Normal temperature, No atypical lesions or rash.  Heart: Normal sounds, no murmurs  Lungs: Normal respiratory effort.  Gastrointestinal: Non tender, Non distended, No masses, guarding or  rebound, No hepatosplenomegally, No hernia.  Ext: No clubbing, cyanosis, edema or varicosities.  DTR's: 2+ bilaterally  Strength 5/5 bilateral upper and lower extremities    Genitourinary- (Verbal consent obtained; Female chaperone  present during the pelvic exam)    Vulva: normal without lesions, masses, atrophy or pain  Urethra: meatus central and normal in appearance, no prolapse/caruncle, no masses or discharge. Empty supine stress test was .  Bladder: non-tender, no masses  Vagina: No discharge or lesions, negativesevere atrophy, no masses appreciated.  [See POP-Q]  Cervix: absent  Uterus:  absent  Adnexa: absent  Rectal: deferred   Neuro: S2-4- pin prick and light touch intact, Anal wink present  Levator strength: 0/5  Levator tenderness: left 2/10 and right 0/10    POP-Q Exam- pelvic organ prolapse quantitative    Aa -2  Anterior Wall Ba -2  Anterior wall C -6  Cervix or cuff   Gh 3  Genital hiatus pb 3  perineal body tvl 7  Total vaginal length   Ap -2  Posterior wall Bp -2  Posterior wall D n/a  Posterior fornix     without Uterus    POP-Q Stage:1      Office Visit on 12/20/2024   Component Date Value Ref Range Status    Glucose, UA 12/20/2024 100   Final    Bilirubin, POC 12/20/2024 Neg   Final    Ketones, UA 12/20/2024 Neg   Final    Spec Grav UA 12/20/2024 1.025   Final    Blood, UA 12/20/2024 About 250   Final    pH, UA 12/20/2024 5   Final    Protein, POC 12/20/2024 Trace   Final    Urobilinogen, UA 12/20/2024 Neg   Final    Nitrite, UA 12/20/2024 Neg   Final    WBC, UA 12/20/2024 Neg   Final    Color, UA 12/20/2024 Yellow   Final    Clarity, UA 12/20/2024 Clear   Final    POC Residual Urine Volume 12/20/2024 185 (A)  0 - 100 mL Final        CT Stone Study: 11/15/24-   There is no evidence for hydroureter, ureteral calculus or obstructive uropathy bilaterally.  There is no perinephric inflammatory change.  There is appearance at the pelvic floor that may relate to pelvic floor relaxation.  The urinary bladder otherwise appears appropriate.     Bandlike opacity at the left lung base likely represents atelectatic change and or scar additional findings of atelectatic change and or scarring are noted at the lung bases.  There is a  small hiatal hernia noted.  The gallbladder is surgically absent.  When accounting for limitations of the exam there is no evidence for acute process of the stomach, liver, pancreas, spleen or adrenal glands.  The abdominal aorta appears normal in caliber, atherosclerotic change noted.  The patient appears status post hysterectomy.     There is no evidence for small bowel obstructive process.  The appendix is identified, it does not appear inflamed.  There is no evidence for inflammatory or obstructive process of the colon.  Small fat density lower abdominal wall hernias are noted.  There is no evidence for free intraperitoneal air.     The visualized osseous structures demonstrate chronic change.     Impression:     There is no evidence for ureteral calculus or obstructive uropathy bilaterally.   There is no additional evidence for acute process of the abdomen or pelvis.   Additional findings as above.    ASSESSMENT & PLAN:    Urge incontinence of urine  -     Ambulatory referral/consult to Physical/Occupational Therapy; Future; Expected date: 12/27/2024  -     mirabegron (MYRBETRIQ) 50 mg Tb24; Take 1 tablet (50 mg total) by mouth once daily.  Dispense: 90 tablet; Refill: 0    Stress incontinence  -     Ambulatory referral/consult to Physical/Occupational Therapy; Future; Expected date: 12/27/2024    Cystocele, unspecified  -     Ambulatory referral/consult to Urogynecology  -     POCT URINE DIPSTICK WITHOUT MICROSCOPE  -     POCT Bladder Scan    Hematuria, unspecified type  -     Ambulatory referral/consult to Urogynecology  -     POCT URINE DIPSTICK WITHOUT MICROSCOPE  -     POCT Bladder Scan  -     Cystoscopy; Future  -     Urinalysis Microscopic  -     Urine culture    Vaginal atrophy  -     estradioL (ESTRACE) 0.01 % (0.1 mg/gram) vaginal cream; 0.5 grams with applicator or dime-sized amount with finger in vagina nightly x 2 weeks, then three times a week thereafter  Dispense: 42.5 g; Refill: 3    Chronic  idiopathic constipation       Exam findings and treatment options were discussed in detail with the patient. Her symptoms are consistent with mixed urinary incontinence. There is stage 1 prolapse noted on her exam today and we reviewed that factors such as constipation can contribute to worsening. If her constipation has improved in the last 3 weeks then the prolapse may not be as apparent.     Reviewed that we should proceed with a cystoscopy to assess her bladder due to the gross hematuria.Will send her urine out for UA micro and culture.     We spent time in discussion today of the differences between UUI and TED. We reviewed treatment options of each type of incontinence as well, discussing in detail that for UUI she can try conservative measures such as bladder retraining, PT or medications and for TED options such as PT, Revive, pessary use or surgery. Patient is bothered by both symptoms. Suggested that she start with fluid titration, reviewed how concentrated urine is acidic and irritating to the bladder but also contributes to constipation. She was also prescribed Myrbetriq 50 mg and will return in about 2 months for re-evaluation of the OAB/ urgency urinary incontinence symptoms. She is considering surgery for the stress urinary incontinence and I suggested we first work on the OAB symptoms as surgical success increases with control of OAB/urgency urinary incontinence. Additional information was provided in English and Faroese.     For the constipation we reviewed that she needs to increase her water and fiber intake to prevent worsening of any prolapse and it contributes to OAB. If these measures do not help we discussed taking Miralax daily.     All questions were answered today. The patient was encouraged to contact the office as needed with any additional questions or concerns.     Total time spent on visit was 62 minutes.  This includes face to face time and non-face to face time preparing to see the  patient (eg, review of tests), Obtaining and/or reviewing separately obtained history, Documenting clinical information in the electronic or other health record, Independently interpreting resultsand communicating results to the patient/family/caregiver, or Care coordination.    Silvia Ojeda MD         no

## 2024-12-23 DIAGNOSIS — N39.0 ACUTE UTI: Primary | ICD-10-CM

## 2024-12-23 RX ORDER — CEPHALEXIN 500 MG/1
500 CAPSULE ORAL 2 TIMES DAILY
Qty: 14 CAPSULE | Refills: 0 | Status: SHIPPED | OUTPATIENT
Start: 2024-12-23 | End: 2024-12-30

## 2024-12-31 DIAGNOSIS — S52.572A OTHER CLOSED INTRA-ARTICULAR FRACTURE OF DISTAL END OF LEFT RADIUS, INITIAL ENCOUNTER: Primary | ICD-10-CM

## 2025-01-02 ENCOUNTER — OFFICE VISIT (OUTPATIENT)
Dept: ORTHOPEDICS | Facility: CLINIC | Age: 75
End: 2025-01-02
Payer: MEDICAID

## 2025-01-02 DIAGNOSIS — S52.572A OTHER CLOSED INTRA-ARTICULAR FRACTURE OF DISTAL END OF LEFT RADIUS, INITIAL ENCOUNTER: Primary | ICD-10-CM

## 2025-01-02 PROCEDURE — 99024 POSTOP FOLLOW-UP VISIT: CPT | Mod: ,,, | Performed by: SURGERY

## 2025-01-02 PROCEDURE — 1159F MED LIST DOCD IN RCRD: CPT | Mod: CPTII,,, | Performed by: SURGERY

## 2025-01-02 PROCEDURE — 1100F PTFALLS ASSESS-DOCD GE2>/YR: CPT | Mod: CPTII,,, | Performed by: SURGERY

## 2025-01-02 PROCEDURE — 1126F AMNT PAIN NOTED NONE PRSNT: CPT | Mod: CPTII,,, | Performed by: SURGERY

## 2025-01-02 PROCEDURE — 99213 OFFICE O/P EST LOW 20 MIN: CPT | Mod: PBBFAC,PN | Performed by: SURGERY

## 2025-01-02 PROCEDURE — 3288F FALL RISK ASSESSMENT DOCD: CPT | Mod: CPTII,,, | Performed by: SURGERY

## 2025-01-02 PROCEDURE — 99999 PR PBB SHADOW E&M-EST. PATIENT-LVL III: CPT | Mod: PBBFAC,,, | Performed by: SURGERY

## 2025-01-02 NOTE — PROGRESS NOTES
SUBJECTIVE:    Ms. Chance Gilman is here today for a follow up visit for left distal radius fracture.  She states her pain has decreased since wearing the brace consistently throughout the day.  She has been compliant with all directions given to her from last appointment.      Would like to continue OT. Discontinue brace in 2 weeks.    OBJECTIVE:      Vitals:    01/02/25 0912   PainSc: 0-No pain   PainLoc: Wrist       ORTHOPEDIC EXAM:  There were no vitals filed for this visit.  General: No acute distress, well-appearing  Neurologic: Alert and oriented x3  Psychiatric: Appropriate mood and affect, cooperative  Cardiovascular: Regular pulse  Respiratory: Breathing on room air  Skin: No rashes or ulcers  Other:  Palpable pulses, nontender on palpation over the wrist. ROM WNL versus contralateral side.      DIAGNOSTIC STUDIES:  Left wrist complete taken in clinic today.    ASSESSMENT:   1. Closed intra-articular fracture of distal end of the left radius. Sagittal alignment maintained. Some shortening in coronal plane.    PLAN:  There are no diagnoses linked to this encounter.    Continue nonoperative management.  We will discontinue the brace at 6 weeks. Continue Occupational therapy ordered.  Follow up with PA in 4-6 weeks.  X-rays at that time. Anticipated final follow up    Eleno Lundberg MD  [facility]  Orthopedic Surgery      This note was done with voice recognition software. Please excuse any errors missed in proof reading.

## 2025-01-28 PROBLEM — Z74.09 IMPAIRED MOBILITY AND ACTIVITIES OF DAILY LIVING: Chronic | Status: ACTIVE | Noted: 2024-12-16

## 2025-01-28 PROBLEM — Z78.9 IMPAIRED MOBILITY AND ACTIVITIES OF DAILY LIVING: Chronic | Status: ACTIVE | Noted: 2024-12-16

## 2025-02-18 NOTE — PROGRESS NOTES
SUBJECTIVE:      Chief Complaint: Pain of the Left Wrist      History of Present Illness:  Patient is a  74 y.o.  who presents today for fracture f/u of left distal radius .   Last saw Dr Lundberg on 1/2/2025  Reports they have been compliant with restrictions.   Denies new trauma or injury.  Current tx: HEP (completed OT), has weaned from wrist brace  Today, pt complains of paresthesias at night  Pain has been controlled  Denies numbness/tingling    MARIBEL: SRAVANI  DOI: 11/22/2024 (4 months)    Pt presents with her daughter as her     Past Medical History:   Diagnosis Date    Asthma     Chronic    Hypertension     Panic attack     Personal history of colonic polyps     Rheumatoid arthritis      Past Surgical History:   Procedure Laterality Date    BLADDER SURGERY  1988    For urinary incontinence    CHOLECYSTECTOMY      COLONOSCOPY N/A 05/01/2018    Procedure: COLONOSCOPY;  Surgeon: Giovanni Arias MD;  Location: [facility] ENDO;  Service: Endoscopy;  Laterality: N/A;    COLONOSCOPY N/A 02/06/2024    Procedure: COLONOSCOPY;  Surgeon: Patt Nunez MD;  Location: [facility] ENDO;  Service: Endoscopy;  Laterality: N/A;    HYSTERECTOMY  1998    LAZARO/ BSO for menorrhagia/ fibroid, also release her bladder    KNEE ARTHROSCOPY Right     TONSILLECTOMY       Review of patient's allergies indicates:  No Known Allergies  Social History     Social History Narrative    Lives with daughter in [place]. Travels to stay with her children throughout the year. Spends 6 months in the [place].      Family History   Problem Relation Name Age of Onset    Asthma Mother      Other (bladder prolapse) Mother      No Known Problems Father         Current Medications[1]    Review of Systems   Musculoskeletal:  Positive for joint pain, joint swelling, myalgias and stiffness.   Neurological:  Negative for numbness and paresthesias.       OBJECTIVE:      Vital Signs (Most Recent):  There were no vitals filed for this visit.  There is  no height or weight on file to calculate BMI.    Physical Exam:  left UE/wrist Examination:  Observation/Inspection:  Swelling  mild   Deformity  none  Discoloration  none   Scars   none    Atrophy  none  Strength  4+/5  No TTP at fracture site    Durkans positive  Tinel's at the wrist positive    Neurovascular Exam:  Digits WWP, brisk CR < 3s throughout  NVI motor/LTS to M/R/U nerves, radial pulse 2+    ROM hand full, painless  ROM wrist full, painless   ROM elbow full, painless      Diagnostic Results:  Imaging - I independently interpreted the patient's imaging. Xrays of the patient's L wrist which demonstrate an acute fracture of left distal radius, which is now healed  Compared to previous xr, no significant displacement or angulation  No dislocations or significant degenerative changes.    ASSESSMENT/PLAN:      1. Other closed intra-articular fracture of distal end of left radius with routine healing, subsequent encounter    2. Left carpal tunnel syndrome        74 y.o. y/o female with left distal radius fx, which is now healed  Plan: The patient and I had a thorough discussion today.  We discussed the working diagnosis as well as several other potential alternative diagnoses.    We discussed conservative and surgical treatment options. Conservative options include rest, activity modifications, workplace modifications, po and topical analgesia (OTC and rx), formal or home PT, and others. Surgical treatment was also mentioned.    At this time, the patient would like to proceed with the following, which I agree with.    Medications: OTC analgesia prn for pain. Precautions advised. Pt expressed understanding  Physical Therapy:  Continue HEP  DME: d/c wrist brace.  Given gel wrist brace today for CTS to be worn nightly  Work status:  Increase activity and use of the left wrist as tolerated.  No restrictions  Follow up: to review EMG results with Nini Viramontes PA-C.    All of the patient's questions were answered and  the patient will contact us if they have any questions or concerns in the interim.        Nini Viramontes PA-C  [facility]  Orthopedic Surgery         [1]   Current Outpatient Medications:     albuterol (PROVENTIL) 2.5 mg /3 mL (0.083 %) nebulizer solution, INHALE 1 VIAL VIA NEBULIZER THREE TIMES DAILY AS NEEDED FOR WHEEZING/ SHORTNESS OF BREATH, Disp: , Rfl: 5    albuterol 90 mcg/actuation inhaler, Inhale 1-2 puffs into the lungs every 6 (six) hours as needed for Wheezing. Rescue, Disp: 1 Inhaler, Rfl: 2    BREO ELLIPTA 100-25 mcg/dose diskus inhaler, Inhale 1 puff into the lungs once daily., Disp: , Rfl: 5    cyclobenzaprine (FLEXERIL) 10 MG tablet, Take 10 mg by mouth 3 (three) times daily as needed for Muscle spasms., Disp: , Rfl:     diclofenac sodium (VOLTAREN ARTHRITIS PAIN) 1 % Gel, Apply 2 g topically once daily., Disp: 100 g, Rfl: 5    estradioL (ESTRACE) 0.01 % (0.1 mg/gram) vaginal cream, 0.5 grams with applicator or dime-sized amount with finger in vagina nightly x 2 weeks, then three times a week thereafter, Disp: 42.5 g, Rfl: 3    hydroCHLOROthiazide (HYDRODIURIL) 25 MG tablet, Take 25 mg by mouth., Disp: , Rfl:     hydrOXYzine HCl (ATARAX) 25 MG tablet, TAKE 1 TABLET(S) 3 TIMES A DAY BY ORAL ROUTE AS NEEDED., Disp: , Rfl: 1    ibuprofen (ADVIL,MOTRIN) 600 MG tablet, Take 1 tablet (600 mg total) by mouth every 6 (six) hours as needed for Pain., Disp: 30 tablet, Rfl: 0    losartan (COZAAR) 100 MG tablet, Take 100 mg by mouth once daily., Disp: , Rfl:     mirabegron (MYRBETRIQ) 50 mg Tb24, Take 1 tablet (50 mg total) by mouth once daily., Disp: 90 tablet, Rfl: 0    montelukast (SINGULAIR) 10 mg tablet, TAKE 1 TABLET(S) EVERY DAY BY ORAL ROUTE., Disp: , Rfl: 5    omeprazole (PRILOSEC) 40 MG capsule, Take 40 mg by mouth., Disp: , Rfl:     oxyCODONE-acetaminophen (PERCOCET) 5-325 mg per tablet, Take 1 tablet by mouth every 4 (four) hours as needed for Pain., Disp: 15 tablet, Rfl: 0    predniSONE  (DELTASONE) 5 MG tablet, Take 1 tablet (5 mg total) by mouth once daily., Disp: 90 tablet, Rfl: 3    SPIRIVA RESPIMAT 1.25 mcg/actuation Mist, Take 2 puffs by mouth once daily., Disp: , Rfl: 5    valsartan (DIOVAN) 160 MG tablet, Take 160 mg by mouth once daily., Disp: , Rfl:     zolpidem (AMBIEN) 5 MG Tab, Take 5 mg by mouth., Disp: , Rfl:     albuterol-ipratropium (DUO-NEB) 2.5 mg-0.5 mg/3 mL nebulizer solution, Take 3 mLs by nebulization every 6 (six) hours as needed for Wheezing or Shortness of Breath. Rescue, Disp: 1 Box, Rfl: 0    folic acid (FOLVITE) 1 MG tablet, Take 1 tablet (1 mg total) by mouth once daily., Disp: 90 tablet, Rfl: 3    methotrexate 2.5 MG Tab, Take 8 tablets (20 mg total) by mouth every 7 days., Disp: 96 tablet, Rfl: 1

## 2025-02-19 DIAGNOSIS — R52 PAIN: Primary | ICD-10-CM

## 2025-02-20 ENCOUNTER — OFFICE VISIT (OUTPATIENT)
Dept: ORTHOPEDICS | Facility: CLINIC | Age: 75
End: 2025-02-20
Payer: MEDICAID

## 2025-02-20 DIAGNOSIS — G56.02 LEFT CARPAL TUNNEL SYNDROME: ICD-10-CM

## 2025-02-20 DIAGNOSIS — S52.572D OTHER CLOSED INTRA-ARTICULAR FRACTURE OF DISTAL END OF LEFT RADIUS WITH ROUTINE HEALING, SUBSEQUENT ENCOUNTER: Primary | ICD-10-CM

## 2025-02-20 PROCEDURE — 99213 OFFICE O/P EST LOW 20 MIN: CPT | Mod: PBBFAC,PN

## 2025-06-20 DIAGNOSIS — N39.41 URGE INCONTINENCE OF URINE: ICD-10-CM

## 2025-06-20 RX ORDER — MIRABEGRON 50 MG/1
50 TABLET, FILM COATED, EXTENDED RELEASE ORAL DAILY
Qty: 90 TABLET | Refills: 1 | Status: SHIPPED | OUTPATIENT
Start: 2025-06-20 | End: 2025-09-18